# Patient Record
Sex: MALE | URBAN - METROPOLITAN AREA
[De-identification: names, ages, dates, MRNs, and addresses within clinical notes are randomized per-mention and may not be internally consistent; named-entity substitution may affect disease eponyms.]

---

## 2021-04-08 DIAGNOSIS — Z23 ENCOUNTER FOR IMMUNIZATION: ICD-10-CM

## 2022-12-07 ENCOUNTER — TELEPHONE (OUTPATIENT)
Dept: ENDOCRINOLOGY | Facility: CLINIC | Age: 57
End: 2022-12-07

## 2022-12-07 NOTE — TELEPHONE ENCOUNTER
Left message on pts vm to call our office tomm regarding Dr will need his records before he can be seen

## 2022-12-15 ENCOUNTER — OFFICE VISIT (OUTPATIENT)
Dept: ENDOCRINOLOGY | Facility: CLINIC | Age: 57
End: 2022-12-15

## 2022-12-15 VITALS
WEIGHT: 145 LBS | BODY MASS INDEX: 20.76 KG/M2 | SYSTOLIC BLOOD PRESSURE: 120 MMHG | HEART RATE: 99 BPM | HEIGHT: 70 IN | DIASTOLIC BLOOD PRESSURE: 82 MMHG

## 2022-12-15 DIAGNOSIS — E55.9 VITAMIN D DEFICIENCY: ICD-10-CM

## 2022-12-15 DIAGNOSIS — E11.65 TYPE 2 DIABETES MELLITUS WITH HYPERGLYCEMIA, WITHOUT LONG-TERM CURRENT USE OF INSULIN (HCC): Primary | ICD-10-CM

## 2022-12-15 DIAGNOSIS — I10 PRIMARY HYPERTENSION: ICD-10-CM

## 2022-12-15 DIAGNOSIS — E78.2 MIXED HYPERLIPIDEMIA: ICD-10-CM

## 2022-12-15 RX ORDER — INSULIN GLARGINE 100 [IU]/ML
INJECTION, SOLUTION SUBCUTANEOUS
Qty: 15 ML | Refills: 0 | Status: SHIPPED | OUTPATIENT
Start: 2022-12-15 | End: 2022-12-22 | Stop reason: SDUPTHER

## 2022-12-15 RX ORDER — SITAGLIPTIN AND METFORMIN HYDROCHLORIDE 100; 1000 MG/1; MG/1
1 TABLET, FILM COATED, EXTENDED RELEASE ORAL DAILY
COMMUNITY
Start: 2022-10-11 | End: 2022-12-15 | Stop reason: SDUPTHER

## 2022-12-15 RX ORDER — INSULIN GLARGINE 100 [IU]/ML
INJECTION, SOLUTION SUBCUTANEOUS
Qty: 15 ML | Refills: 0 | Status: SHIPPED | OUTPATIENT
Start: 2022-12-15 | End: 2022-12-15 | Stop reason: SDUPTHER

## 2022-12-15 RX ORDER — ATORVASTATIN CALCIUM 10 MG/1
TABLET, FILM COATED ORAL
Qty: 30 TABLET | Refills: 5 | Status: SHIPPED | OUTPATIENT
Start: 2022-12-15

## 2022-12-15 RX ORDER — BLOOD-GLUCOSE SENSOR
EACH MISCELLANEOUS
Qty: 2 EACH | Refills: 5 | Status: SHIPPED | OUTPATIENT
Start: 2022-12-15

## 2022-12-15 RX ORDER — FLASH GLUCOSE SENSOR
KIT MISCELLANEOUS
COMMUNITY
Start: 2022-10-15

## 2022-12-15 RX ORDER — SITAGLIPTIN AND METFORMIN HYDROCHLORIDE 100; 1000 MG/1; MG/1
TABLET, FILM COATED, EXTENDED RELEASE ORAL
Start: 2022-12-15

## 2022-12-15 RX ORDER — REPAGLINIDE 1 MG/1
TABLET ORAL
COMMUNITY
Start: 2022-10-11 | End: 2022-12-15 | Stop reason: ALTCHOICE

## 2022-12-15 RX ORDER — METFORMIN HYDROCHLORIDE 500 MG/1
500 TABLET, EXTENDED RELEASE ORAL
Qty: 30 TABLET | Refills: 5 | Status: SHIPPED | OUTPATIENT
Start: 2022-12-15

## 2022-12-15 RX ORDER — AMLODIPINE BESYLATE AND BENAZEPRIL HYDROCHLORIDE 5; 20 MG/1; MG/1
1 CAPSULE ORAL DAILY
COMMUNITY
Start: 2022-11-30

## 2022-12-15 RX ORDER — ROSUVASTATIN CALCIUM 10 MG/1
10 TABLET, COATED ORAL DAILY
COMMUNITY
Start: 2022-06-06 | End: 2022-12-15 | Stop reason: ALTCHOICE

## 2022-12-15 RX ORDER — PEN NEEDLE, DIABETIC 32GX 5/32"
NEEDLE, DISPOSABLE MISCELLANEOUS
Qty: 100 EACH | Refills: 1 | Status: SHIPPED | OUTPATIENT
Start: 2022-12-15

## 2022-12-15 RX ORDER — REPAGLINIDE 2 MG/1
TABLET ORAL
Qty: 90 TABLET | Refills: 6 | Status: SHIPPED | OUTPATIENT
Start: 2022-12-15

## 2022-12-15 NOTE — PATIENT INSTRUCTIONS
Take vitamin D ,2000 IU daily with dinner   Take metformin 500 mg with dinner   Take Janumet 100/1000, 1 tablet in AM with breakfast   Take Lantus 10 units in AM   Take Prandin 1 mg , 1 tablet with breakfast, 2 tablets with lunch and 2 tablets with dinner   Check blood sugars before breakfast, before dinner and at bedtime   Eat Carbs 45-60 grms per meals and 15 gram for snack     Basic Carbohydrate Counting   AMBULATORY CARE:   Carbohydrate counting  is a way to plan your meals by counting the amount of carbohydrate in foods  Carbohydrates are the sugars, starches, and fiber found in fruit, grains, vegetables, and milk products  Carbohydrates increase your blood sugar levels  Carbohydrate counting can help you eat the right amount of carbohydrate to keep your blood sugar levels under control  What you need to know about planning meals using carbohydrate counting:  A dietitian or healthcare provider will help you develop a healthy meal plan that works best for you  You will be taught how much carbohydrate to eat or drink for each meal and snack  Your meal plan will be based on your age, weight, usual food intake, and physical activity level  If you have diabetes, it will also include your blood sugar levels and diabetes medicine  Once you know how much carbohydrate you should eat, you can decide what type of food you want to eat  You will need to know what foods contain carbohydrate and how much they contain  Keep track of the amount of carbohydrate in meals and snacks in order to follow your meal plan  Do not avoid carbohydrates or skip meals  Your blood sugar may fall too low if you do not eat enough carbohydrate or you skip meals  Foods that contain carbohydrate:   Breads:  Each serving of food listed below contains about 15 g of carbohydrate       1 slice of bread (1 ounce) or 1 flour or corn tortilla (6 inch)    ½ of a hamburger bun or ¼ of a large bagel (about 1 ounce)    1 pancake (about 4 inches across and ¼ inch thick)    Cereals and grains:  Serving sizes of ready-to-eat cereals vary  Look at the serving size and the total carbohydrate amount listed on the food label  Each serving of food listed below contains about 15 g of carbohydrate   ¾ cup of dry, unsweetened, ready-to-eat cereal or ¼ cup of low-fat granola     ½ cup of oatmeal or other cooked cereal     ? cup of cooked rice or pasta    Starchy vegetables and beans:  Each serving of food listed below contains about 15 g of carbohydrate   ½ cup of corn, green peas, sweet potatoes, or mashed potatoes    ¼ of a large baked potato    ½ cup of beans, lentils, and peas (garbanzo, street, kidney, white, split, black-eyed)    Crackers and snacks:  Each serving of food listed below contains about 15 g of carbohydrate   3 jo-ann cracker squares or 8 animal crackers     6 saltine-type crackers    3 cups of popcorn or ¾ ounce of pretzels, potato chips, or tortilla chips    Fruit:  Each serving of food listed below contains about 15 g of carbohydrate   1 small (4 ounce) piece of fresh fruit or ¾ to 1 cup of fresh fruit    ½ cup of canned or frozen fruit, packed in natural juice    ½ cup (4 ounces) of unsweetened fruit juice    2 tablespoons of dried fruit    Desserts or sugary foods:  Each serving of food listed below contains about 15 g of carbohydrate   2-inch square unfrosted cake or brownie     2 small cookies    ½ cup of ice cream, frozen yogurt, or nondairy frozen yogurt    ¼ cup of sherbet or sorbet    1 tablespoon of regular syrup, jam, or jelly    2 tablespoons of light syrup    Milk and yogurt:  Foods from the milk group contain about 12 g of carbohydrate per serving  1 cup of fat-free or low-fat milk    1 cup of soy milk    ? cup of fat-free, yogurt sweetened with artificial sweetener    Non-starchy vegetables:  Each serving contains about 5 g of carbohydrate   Three servings of non-starch vegetables count as 1 carbohydrate serving       ½ cup of cooked vegetables or 1 cup of raw vegetables  This includes beets, broccoli, cabbage, cauliflower, cucumber, mushrooms, tomatoes, and zucchini    ½ cup of vegetable juice    How to use carbohydrate counting to plan meals:   Count carbohydrate amounts using serving sizes:      Pasta dinner example: You plan to have pasta, tossed salad, and an 8-ounce glass of milk  Your healthcare provider tells you that you may have 4 carbohydrate servings for dinner  One carbohydrate serving of pasta is ? cup  One cup of pasta will equal 3 carbohydrate servings  An 8-ounce glass of milk will count as 1 carbohydrate serving  These amounts of food would equal 4 carbohydrate servings  One cup of tossed salad does not count toward your carbohydrate servings  Count carbohydrate amounts using food labels:  Find the total amount of carbohydrate in a packaged food by reading the food label  Food labels tell you the serving size of the food and the total carbohydrate amount in each serving  Find the serving size on the food label and then decide how many servings you will eat  Multiply the number of servings you plan to eat by the carbohydrate amount per serving  Granola bar snack example: Your meal plan allows you to have 2 carbohydrate servings (30 grams) of carbohydrate for a snack  You plan to eat 1 package of granola bars, which contains 2 bars  According to the food label, the serving size of food in this package is 1 bar  Each serving (1 bar) contains 25 grams of carbohydrate  The total amount of carbohydrate in this package of granola bars would be 50 g  Based on your meal plan, you should eat only 1 bar  Follow up with your doctor as directed:  Write down your questions so you remember to ask them during your visits  © Copyright [employer] 2022 Information is for End User's use only and may not be sold, redistributed or otherwise used for commercial purposes   All illustrations and images included in CareNotes® are the copyrighted property of [employer]  or [facility]   The above information is an  only  It is not intended as medical advice for individual conditions or treatments  Talk to your doctor, nurse or pharmacist before following any medical regimen to see if it is safe and effective for you  Hypoglycemia instructions   Michelle Woowater  12/15/2022  [id number]    Low Blood Sugar    Steps to treat low blood sugar  1  Test blood sugar if you have symptoms of low blood sugar:   Low Blood Sugar Symptoms:  o Sweaty  o Dizzy  o Rapid heartbeat  o Shaky    o Bad mood  o Hungry      2  Treat blood sugar less than 70 with 15 grams of fast-acting carbohydrate:   Examples of 15 grams Fast-Acting Carbohydrate:  o 4 oz juice  o 4 oz regular soda  o 3-4 glucose tablets (chew)  o 3-4 hard candies (chew)              3    Wait 15 minutes and test your blood sugar again           4   If blood sugar is less than 100, repeat steps 2-3       5  When your blood sugar is 100 or more, eat a snack if it will be longer than one hour until your next meal  The snack should be 15 grams of carbohydrate and a protein:   Examples of snacks:  o ½ sandwich  o 6 crackers with cheese  o Piece of fruit with cheese or peanut butter  o 6 crackers with peanut butter

## 2022-12-15 NOTE — PROGRESS NOTES
Bryon Garcia 65 y o  male MRN: [id number]    Encounter: [id number]      Assessment/Plan     Assessment: This is a 65y o -year-old male with diabetes with hyperglycemia  Plan:    Diagnoses and all orders for this visit:    Type 2 diabetes mellitus with hyperglycemia, without long-term current use of insulin ([facility] [address] )  Lab Results   Component Value Date    HGBA1C 9 8 (H) 12/13/2022   Last A1c 9 8%, uncontrolled suggestive of hyperglycemia  Discussed to start using continuous glucose monitor sensor via [employer] for proper monitoring of blood sugars and prevention of hypoglycemia or hypoglycemia  We discussed pathophysiology of type 2 diabetes, mechanism of action of insulin as well as other medications, importance of glycemic control, to goal of 6 5-7% to prevent diabetes complications such as neuropathy, nephropathy, coronary artery disease, stroke and retinopathy  We also discussed considering C-peptide is on the low side, will rule out MARILEE ( Latent Autoimmune diabetes in Adults) by doing tiffany antibodies and islet cell antibodies  We will start Lantus 10 units in the morning because of poor pancreatic reserve,(which could be from gluco-toxicity)   We will start metformin 500 mg with dinner  Continue Janumet 1 tablet in the morning  Change Prandin to 2 mg, half tablet with breakfast and 1 tablet with lunch and dinner  Discussed resubmitting the log in 2 weeks to make further adjustment in regimen  We will repeat C-peptide in 3 months  Once blood sugars are improved we will consider starting SGLT2 inhibitor  Discussed importance of fingerstick monitoring, and sending log in 2 weeks    Discussed importance of follow-up appointment  Discussed hypoglycemia symptoms and treatment  Discussed importance of follow-up with Ophthalmology and podiatry  Educated about carbohydrate counting, dietary modifications, keeping carbohydrate consistent with meals 45 to 50 g with each meal and 15 g for snacks  Continue exercise routine of 30 minutes of walking daily     -     Insulin Pen Needle (BD Pen Needle Caroline U/F) 32G X 4 MM MISC; Use once daily in AM  -     metFORMIN (GLUCOPHAGE-XR) 500 mg 24 hr tablet; Take 1 tablet (500 mg total) by mouth daily with dinner  -     Hemoglobin A1C; Future  -     Basic metabolic panel; Future  -     Vitamin B12; Future  -     T4, free; Future  -     TSH, 3rd generation; Future  -     atorvastatin (LIPITOR) 10 mg tablet; Take one tablet at night  -     repaglinide (PRANDIN) 2 mg tablet; Take half tab with breakfast, 1 tablet with lunch and dinner , hold if blood sugars <100 mg dl  -     C-peptide; Future  -     Glutamic acid decarboxylase; Future  -     Anti-islet cell antibody; Future  -     Janumet -1000 MG TB24; Take one tablet daily with breakfast  -     Insulin Glargine Solostar (Lantus SoloStar) 100 UNIT/ML SOPN; Take 10 units in the morning, with breakfast  -     Continuous Blood Gluc Sensor (FreeStyle Verito 3 Sensor) MISC; Use 1 sensor every 2 weeks    Primary hypertension  BP Readings from Last 3 Encounters:   12/15/22 120/82   Blood pressure is well controlled  Continue management as per PCP  Educated about dietary modifications    Mixed hyperlipidemia  LDL is not at goal  Goal for LDL is less than 100  Start Lipitor 10 mg daily at bedtime  Discussed the side effects such as muscle aches and pains     -     Lipid panel; Future    Vitamin D deficiency/Osteopenia   Start vitamin D 3, 2000 IU daily   -     Vitamin D 25 hydroxy; Future    Other orders  -     amLODIPine-benazepril (LOTREL 5-20) 5-20 MG per capsule;  Take 1 capsule by mouth daily  -      CC: Diabetes    History of Present Illness     HPI:    Tiffany Fisher is 15-year-old gentleman with medical history of type 2 diabetes for 4 to 6 years, managed on oral medications, hypertension, hypercholesterolemia is here for establishment visit for type 2 diabetes management     Duration of type 2 diabetes - 5-6 yrs   History of COVID infection 2 years ago  Lab Results   Component Value Date    HGBA1C 9 8 (H) 12/13/2022     He checks blood sugars 1-2 times daily  He is planning to start using continuous glucose monitor sensor by [employer]  Mother- HTn, and diabetes , H/o Heart disease   Father - No h/o DM and HTN     Diabetes course has been unstable  Denies hospitalization for hyperglycemia or hypoglycemia previously  Current regimen, Prandin 1 mg with breakfast, 2 mg with lunch and 2 mg with dinner  Janumet 100/1000, 1 tablet daily with breakfast     For hyperlipidemia and was started on Crestor however currently not taking it  Last LDL is 110 mg per DL  For hypertension, he takes Lotrel 5/20 mg, 1 tablet daily  Blood pressure is well controlled  He admits no complications of diabetes, no complaints of tingling or numbness in extremities, no blurriness vision, no retinopathy, no chest pain, shortness of breath(exercise stress test was normal recently) no microalbuminuria    He walks most of the days during week , 30 minutes to 1 hour   Reviewed blood work results    Dexa scan showed Osteopenia   No h/o fracture bones   Wt Readings from Last 3 Encounters:   12/15/22 65 8 kg (145 lb)       Lab Results   Component Value Date    HGBA1C 9 8 (H) 12/13/2022     C-Peptide 0 8 - 4 2 ng/mL 1 1      C - PEPTIDE  Order: [id number]   Ref Range & Units 6/24/22  8:45 AM   C-Peptide 0 8 - 4 2 ng/mL 1 1    Specimen Collected: 06/24/22  8:45 AM Last Resulted: 06/27/22  5:49 PM   Received From: [address] [place] [address]  Result Received: 12/13/22 11:44 AM       LIPID PANEL  Order: [id number]   Ref Range & Units 12/13/22  7:38 AM   Cholesterol <200 mg/dL 187    Triglyceride <150 mg/dL 70    Cholesterol, HDL, Direct >40 mg/dL 65    Cholesterol, Non-HDL <160 mg/dL 122    Comment: Note: For NCEP interpretive guidelines please refer to the Laboratory Handbook     Cholesterol, LDL, Calculated <130 mg/dL 108    Comment: LDL Cholesterol was calculated using the Friedewald equation  Direct measurement of LDL is not indicated for this patient based on [facility]'s analytical algorithm for measurement of LDL Cholesterol  CHOL/HDL Ratio  2 88        Creatinine, Urine 50 0 - 200 0 mg/dL 45 8 Low     Albumin, Urine <2 0 mg/dL 1 2    Albumin/Creatinine Ratio, Urine <30 mg/gm CREA 26 2    Specimen Collected: 12/13/22  7:38 AM Last Resulted: 12/13/22 10:27 PM     Glucose 65 - 99 mg/dL 173 High     BUN 7 - 28 mg/dL 6 Low     Creatinine 0 53 - 1 30 mg/dL 0 90    Sodium 135 - 145 mmol/L 134 Low     Potassium 3 5 - 5 2 mmol/L 4 3    Chloride 100 - 109 mmol/L 100    Carbon Dioxide 23 - 31 mmol/L 25    Calcium 8 5 - 10 1 mg/dL 9 3    Alkaline Phosphatase 35 - 120 U/L 63    Albumin 3 5 - 4 8 g/dL 4 1    Bilirubin, Total 0 2 - 1 0 mg/dL 1 2 High     Comment: Use of this assay is not recommended for patients undergoing treatment with eltrombopag due to the potential for falsely elevated results  Protein, Total 6 3 - 8 3 g/dL 7 4    AST <41 U/L 23    ALT <56 U/L 40    Anion Gap 3 - 11 9    eGFRcr >59 100    eGFRcr Comment  Interpretive information: calculated GFR    Comment:                                            Units: mL/min per 1 73 square meters   Reported eGFR is based on the CKD-EPI 2021 creatinine equation that does not use a race coefficient and conforms to the NKF-ASN Task Force Recommendations     Note: Calculated GFR may not be an accurate indicator of renal function if the patient's renal function is not in a steady state                                               GFR Categories in Chronic Kidney Disease (CKD):   GFR        GFR (mL/min/1 73   Category:  square meters):  Interpretation:   G1         90 or greater    Normal or high*   G2         60 - 89          Mild decrease*   G3a        45 - 59          Mild to moderate decrease   G3b        30 - 44          Moderate to severe decrease   G4         15 - 29          Severe decrease   G5         14 or less       Kidney failure                                            *In the absence of evidence of kidney damage, neither GFR category G1 or G2 fulfill the criteria for CKD  Kidney Int Suppl 2013, 3: 1-1 50  Specimen Collected: 12/13/22  7:38 AM Last Resulted: 12/14/22 12:04 AM         Review of Systems   Constitutional: Negative for activity change, diaphoresis, fatigue, fever and unexpected weight change  HENT: Negative  Eyes: Negative for visual disturbance  Respiratory: Negative for cough, chest tightness and shortness of breath  Cardiovascular: Negative for chest pain, palpitations and leg swelling  Gastrointestinal: Negative for abdominal pain, blood in stool, constipation, diarrhea, nausea and vomiting  Endocrine: Negative for cold intolerance, heat intolerance, polydipsia, polyphagia and polyuria  Genitourinary: Negative for dysuria, enuresis, frequency and urgency  Musculoskeletal: Negative for arthralgias and myalgias  Skin: Negative for pallor, rash and wound  Allergic/Immunologic: Negative  Neurological: Negative for dizziness, tremors, weakness and numbness  Hematological: Negative  Psychiatric/Behavioral: Negative  Historical Information   History reviewed  No pertinent past medical history  History reviewed  No pertinent surgical history  Social History   Social History     Substance and Sexual Activity   Alcohol Use Yes    Comment: social     Social History     Substance and Sexual Activity   Drug Use Never     Social History     Tobacco Use   Smoking Status Never   Smokeless Tobacco Never     Family History: History reviewed  No pertinent family history      Meds/Allergies   Current Outpatient Medications   Medication Sig Dispense Refill   • amLODIPine-benazepril (LOTREL 5-20) 5-20 MG per capsule Take 1 capsule by mouth daily     • atorvastatin (LIPITOR) 10 mg tablet Take one tablet at night 30 tablet 5   • Continuous Blood Gluc Sensor (FreeStyle Verito 3 Sensor) MISC Use 1 sensor every 2 weeks 2 each 5   • Insulin Glargine Solostar (Lantus SoloStar) 100 UNIT/ML SOPN Take 10 units in the morning, with breakfast 15 mL 0   • Insulin Pen Needle (BD Pen Needle Caroline U/F) 32G X 4 MM MISC Use once daily in  each 1   • Janumet -1000 MG TB24 Take one tablet daily with breakfast     • metFORMIN (GLUCOPHAGE-XR) 500 mg 24 hr tablet Take 1 tablet (500 mg total) by mouth daily with dinner 30 tablet 5   • repaglinide (PRANDIN) 2 mg tablet Take half tab with breakfast, 1 tablet with lunch and dinner , hold if blood sugars <100 mg dl 90 tablet 6   • Continuous Blood Gluc Sensor (FreeStyle Verito 2 Sensor) MISC USE EVERY 14 DAYS (Patient not taking: Reported on 12/15/2022)       No current facility-administered medications for this visit  No Known Allergies    Objective   Vitals: Blood pressure 120/82, pulse 99, height 5' 10" (1 778 m), weight 65 8 kg (145 lb)  Physical Exam  Vitals reviewed  Constitutional:       General: He is not in acute distress  Appearance: He is well-developed  He is not diaphoretic  HENT:      Head: Normocephalic and atraumatic  Nose: Nose normal       Mouth/Throat:      Mouth: Mucous membranes are moist    Eyes:      General:         Right eye: No discharge  Left eye: No discharge  Conjunctiva/sclera: Conjunctivae normal       Pupils: Pupils are equal, round, and reactive to light  Neck:      Thyroid: No thyromegaly  Trachea: No tracheal deviation  Cardiovascular:      Rate and Rhythm: Normal rate and regular rhythm  Heart sounds: Normal heart sounds  No murmur heard  No friction rub  Pulmonary:      Effort: Pulmonary effort is normal  No respiratory distress  Breath sounds: Normal breath sounds  No wheezing or rales  Chest:      Chest wall: No tenderness  Abdominal:      General: Bowel sounds are normal  There is no distension  Palpations: Abdomen is soft  Tenderness: There is no abdominal tenderness  Musculoskeletal:         General: No tenderness or deformity  Normal range of motion  Cervical back: Normal range of motion and neck supple  Lymphadenopathy:      Cervical: No cervical adenopathy  Skin:     General: Skin is warm and dry  Coloration: Skin is not pale  Findings: No erythema or rash  Neurological:      General: No focal deficit present  Mental Status: He is alert and oriented to person, place, and time  Motor: No abnormal muscle tone  Coordination: Coordination normal       Deep Tendon Reflexes: Reflexes are normal and symmetric  Reflexes normal    Psychiatric:         Mood and Affect: Mood normal          Behavior: Behavior normal          The history was obtained from the review of the chart, patient  Lab Results:   Lab Results   Component Value Date/Time    Hemoglobin A1C 9 8 (H) 12/13/2022 07:38 AM    Hemoglobin A1C 10 9 (H) 04/26/2022 07:50 AM           Imaging Studies: I have personally reviewed pertinent reports  Portions of the record may have been created with voice recognition software  Occasional wrong word or "sound a like" substitutions may have occurred due to the inherent limitations of voice recognition software  Read the chart carefully and recognize, using context, where substitutions have occurred

## 2022-12-22 ENCOUNTER — TELEPHONE (OUTPATIENT)
Dept: ENDOCRINOLOGY | Facility: CLINIC | Age: 57
End: 2022-12-22

## 2022-12-22 DIAGNOSIS — E11.65 TYPE 2 DIABETES MELLITUS WITH HYPERGLYCEMIA, WITHOUT LONG-TERM CURRENT USE OF INSULIN (HCC): ICD-10-CM

## 2022-12-22 RX ORDER — INSULIN GLARGINE 100 [IU]/ML
INJECTION, SOLUTION SUBCUTANEOUS
Qty: 15 ML | Refills: 1 | Status: SHIPPED | OUTPATIENT
Start: 2022-12-22

## 2023-01-04 ENCOUNTER — PATIENT MESSAGE (OUTPATIENT)
Dept: ENDOCRINOLOGY | Facility: CLINIC | Age: 58
End: 2023-01-04

## 2023-01-04 DIAGNOSIS — E11.65 TYPE 2 DIABETES MELLITUS WITH HYPERGLYCEMIA, WITHOUT LONG-TERM CURRENT USE OF INSULIN (HCC): ICD-10-CM

## 2023-01-04 RX ORDER — INSULIN GLARGINE 100 [IU]/ML
INJECTION, SOLUTION SUBCUTANEOUS
Qty: 15 ML | Refills: 1 | Status: SHIPPED | OUTPATIENT
Start: 2023-01-04 | End: 2023-01-10 | Stop reason: SDUPTHER

## 2023-01-10 DIAGNOSIS — E11.65 TYPE 2 DIABETES MELLITUS WITH HYPERGLYCEMIA, WITHOUT LONG-TERM CURRENT USE OF INSULIN (HCC): ICD-10-CM

## 2023-01-10 RX ORDER — INSULIN GLARGINE 100 [IU]/ML
INJECTION, SOLUTION SUBCUTANEOUS
Qty: 15 ML | Refills: 1 | Status: SHIPPED | OUTPATIENT
Start: 2023-01-10 | End: 2023-01-13 | Stop reason: SDUPTHER

## 2023-01-13 ENCOUNTER — PATIENT MESSAGE (OUTPATIENT)
Dept: ENDOCRINOLOGY | Facility: CLINIC | Age: 58
End: 2023-01-13

## 2023-01-13 DIAGNOSIS — E11.65 TYPE 2 DIABETES MELLITUS WITH HYPERGLYCEMIA, WITHOUT LONG-TERM CURRENT USE OF INSULIN (HCC): ICD-10-CM

## 2023-01-13 RX ORDER — INSULIN GLARGINE 100 [IU]/ML
INJECTION, SOLUTION SUBCUTANEOUS
Qty: 15 ML | Refills: 1 | Status: SHIPPED | OUTPATIENT
Start: 2023-01-13 | End: 2023-01-17 | Stop reason: ALTCHOICE

## 2023-01-17 DIAGNOSIS — E11.65 TYPE 2 DIABETES MELLITUS WITH HYPERGLYCEMIA, WITHOUT LONG-TERM CURRENT USE OF INSULIN (HCC): Primary | ICD-10-CM

## 2023-01-17 RX ORDER — INSULIN GLARGINE 300 U/ML
10 INJECTION, SOLUTION SUBCUTANEOUS
Qty: 3 ML | Refills: 1 | Status: SHIPPED | OUTPATIENT
Start: 2023-01-17

## 2023-06-01 DIAGNOSIS — E11.65 TYPE 2 DIABETES MELLITUS WITH HYPERGLYCEMIA, WITHOUT LONG-TERM CURRENT USE OF INSULIN (HCC): ICD-10-CM

## 2023-06-01 RX ORDER — METFORMIN HYDROCHLORIDE 500 MG/1
TABLET, EXTENDED RELEASE ORAL
Qty: 30 TABLET | Refills: 5 | OUTPATIENT
Start: 2023-06-01

## 2023-06-27 DIAGNOSIS — E11.65 TYPE 2 DIABETES MELLITUS WITH HYPERGLYCEMIA, WITHOUT LONG-TERM CURRENT USE OF INSULIN (HCC): ICD-10-CM

## 2023-06-27 RX ORDER — PEN NEEDLE, DIABETIC 32GX 5/32"
NEEDLE, DISPOSABLE MISCELLANEOUS
Qty: 100 EACH | Refills: 1 | Status: SHIPPED | OUTPATIENT
Start: 2023-06-27

## 2023-07-08 DIAGNOSIS — E11.65 TYPE 2 DIABETES MELLITUS WITH HYPERGLYCEMIA, WITHOUT LONG-TERM CURRENT USE OF INSULIN (HCC): ICD-10-CM

## 2023-07-10 RX ORDER — REPAGLINIDE 2 MG/1
TABLET ORAL
Qty: 90 TABLET | Refills: 1 | Status: SHIPPED | OUTPATIENT
Start: 2023-07-10

## 2023-07-10 RX ORDER — REPAGLINIDE 2 MG/1
TABLET ORAL
Qty: 90 TABLET | Refills: 6 | OUTPATIENT
Start: 2023-07-10

## 2023-07-10 NOTE — TELEPHONE ENCOUNTER
Called pt to schedule pt for an appt and I called pt and left message on pts voicemail to call us to schedule an appt

## 2023-07-10 NOTE — TELEPHONE ENCOUNTER
I have refilled it for now , pt was last seen in Dec 2022       Please call pt to notify, he needs to follow up in order to get future refills     Esther Sherwood

## 2023-07-26 DIAGNOSIS — E11.65 TYPE 2 DIABETES MELLITUS WITH HYPERGLYCEMIA, WITHOUT LONG-TERM CURRENT USE OF INSULIN (HCC): ICD-10-CM

## 2023-07-26 RX ORDER — METFORMIN HYDROCHLORIDE 500 MG/1
500 TABLET, EXTENDED RELEASE ORAL
Qty: 30 TABLET | Refills: 0 | OUTPATIENT
Start: 2023-07-26

## 2023-07-26 RX ORDER — METFORMIN HYDROCHLORIDE 500 MG/1
500 TABLET, EXTENDED RELEASE ORAL
Qty: 90 TABLET | Refills: 0 | Status: SHIPPED | OUTPATIENT
Start: 2023-07-26

## 2023-07-26 RX ORDER — SITAGLIPTIN AND METFORMIN HYDROCHLORIDE 1000; 100 MG/1; MG/1
TABLET, FILM COATED, EXTENDED RELEASE ORAL
Qty: 90 TABLET | Refills: 0 | Status: SHIPPED | OUTPATIENT
Start: 2023-07-26

## 2023-07-26 RX ORDER — REPAGLINIDE 2 MG/1
TABLET ORAL
Qty: 90 TABLET | Refills: 0 | Status: SHIPPED | OUTPATIENT
Start: 2023-07-26 | End: 2023-08-24

## 2023-07-26 RX ORDER — REPAGLINIDE 2 MG/1
TABLET ORAL
Qty: 90 TABLET | Refills: 0 | OUTPATIENT
Start: 2023-07-26

## 2023-07-26 NOTE — TELEPHONE ENCOUNTER
I have given 3 months supply, if pt does not make appt or follow up , will not refill the meds     Please call pt and make appt for follow up, 12.40 appt is [facility] , also explain pt it is important to follow up     [facility]

## 2023-08-24 DIAGNOSIS — E11.65 TYPE 2 DIABETES MELLITUS WITH HYPERGLYCEMIA, WITHOUT LONG-TERM CURRENT USE OF INSULIN (HCC): ICD-10-CM

## 2023-08-24 RX ORDER — REPAGLINIDE 2 MG/1
TABLET ORAL
Qty: 90 TABLET | Refills: 0 | Status: SHIPPED | OUTPATIENT
Start: 2023-08-24

## 2023-08-24 NOTE — TELEPHONE ENCOUNTER
Please call patient to schedule follow up. A 30 day supply refill has been sent to the pharmacy but we will not be able to proscribe further medications until patient follows up.

## 2023-08-24 NOTE — TELEPHONE ENCOUNTER
Please inform pt that he must make appt , as per protocol we will not be able to renew medications, if appt is not made ( I have sent rx for 30 days )     Ezra Persaud

## 2023-09-21 DIAGNOSIS — E11.65 TYPE 2 DIABETES MELLITUS WITH HYPERGLYCEMIA, WITHOUT LONG-TERM CURRENT USE OF INSULIN (HCC): ICD-10-CM

## 2023-09-21 RX ORDER — REPAGLINIDE 2 MG/1
TABLET ORAL
Qty: 90 TABLET | Refills: 1 | Status: SHIPPED | OUTPATIENT
Start: 2023-09-21

## 2023-10-08 DIAGNOSIS — E11.65 TYPE 2 DIABETES MELLITUS WITH HYPERGLYCEMIA, WITHOUT LONG-TERM CURRENT USE OF INSULIN (HCC): ICD-10-CM

## 2023-10-09 ENCOUNTER — TELEPHONE (OUTPATIENT)
Dept: ENDOCRINOLOGY | Facility: CLINIC | Age: 58
End: 2023-10-09

## 2023-10-09 RX ORDER — SITAGLIPTIN AND METFORMIN HYDROCHLORIDE 1000; 100 MG/1; MG/1
TABLET, FILM COATED, EXTENDED RELEASE ORAL
Qty: 90 TABLET | Refills: 0 | Status: SHIPPED | OUTPATIENT
Start: 2023-10-09

## 2023-10-09 NOTE — TELEPHONE ENCOUNTER
Pt failed the protocol   Please call pt to make appt this week , Friday at the end of the day, he is overdue for blood work, we will not be able to refill medications with out blood work ,   Last appt was in Dec, appt is overdue     [facility] Surgeon: Tu Hartman Jr., M.D.    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  Patient does take PPI on regular basis    Procedure:     The procedure, indications, preparation and potential complications were explained to the patient, who indicated understanding and signed the corresponding consent forms.  The patient was identified, taken to the endoscopy suite, and placed on the left side down decubitus position.  The patient underwent a MAC anesthesia and was appropriately monitored through the case by the anesthesia personnel with continuous pulse oximetry, blood pressure, and cardiac monitoring.  A bite block was placed.  After adequate IV sedation and using a transoral technique a lubed flexible endoscope was placed in the hypopharynx and advanced to the second portion of the duodenum without difficulty. The scope was then withdrawn back into the antrum of the stomach.  Cold forcep biopsies of the antrum were taken to rule out Helicobacter pylori.  The scope was retroflexed noting the body, fundus and cardia.  The scope was then withdrawn back into the esophagus after decompressing the stomach.  The Z line was noted and GE junction measured from the incisors.  The scope was then completely withdrawn.  The patient tolerated the procedure well and left the endoscopy suite in stable condition.  The findings are listed below.    Duodenum: Unremarkable  Antrum: Patchy erythema  Body/Fundus: Minimal scarring from previous lap band removal  Cardia: Unremarkable  Esophagus: Z-line regular, no erosive esophagitis or Medrano's noted    Recommendations:     Await biopsy results and follow-up in the office

## 2023-10-16 ENCOUNTER — TELEPHONE (OUTPATIENT)
Dept: ENDOCRINOLOGY | Facility: CLINIC | Age: 58
End: 2023-10-16

## 2023-10-16 DIAGNOSIS — E55.9 VITAMIN D DEFICIENCY: ICD-10-CM

## 2023-10-16 DIAGNOSIS — Z79.4 TYPE 2 DIABETES MELLITUS WITH HYPERGLYCEMIA, WITH LONG-TERM CURRENT USE OF INSULIN (HCC): Primary | ICD-10-CM

## 2023-10-16 DIAGNOSIS — E78.2 MIXED HYPERLIPIDEMIA: ICD-10-CM

## 2023-10-16 DIAGNOSIS — E11.65 TYPE 2 DIABETES MELLITUS WITH HYPERGLYCEMIA, WITH LONG-TERM CURRENT USE OF INSULIN (HCC): Primary | ICD-10-CM

## 2023-10-16 LAB
EXTERNAL EGFR: 92
HBA1C MFR BLD HPLC: 8.5 %

## 2023-10-16 NOTE — TELEPHONE ENCOUNTER
Spoke with pt couple of days ago, about making appt for follow up, my chart message was also sent, appt has not been made [facility] [place] [facility] [place] and spoke with wife, Regarding appt is needed for follow up,   Also discussed we will not be able to refill the meds, until blood work is complete and appt is made   Blood work is ordered in [employer]     Please call pt and make appt this week at the end of the day     Thanks     Red Seraphim

## 2023-10-18 ENCOUNTER — TELEPHONE (OUTPATIENT)
Dept: ENDOCRINOLOGY | Facility: CLINIC | Age: 58
End: 2023-10-18

## 2023-10-20 ENCOUNTER — TELEPHONE (OUTPATIENT)
Dept: ENDOCRINOLOGY | Facility: CLINIC | Age: 58
End: 2023-10-20

## 2023-10-25 ENCOUNTER — TELEPHONE (OUTPATIENT)
Dept: ENDOCRINOLOGY | Facility: CLINIC | Age: 58
End: 2023-10-25

## 2023-10-25 NOTE — TELEPHONE ENCOUNTER
Called and spoke with pt , discussed the results, A1c is 8.5%. Also discussed that her vitamin B12 is low, he should start taking vitamin B12 supplementation, 100 mcg daily and vitamin D3 supplementation 2000 IU daily.   Discussed to make follow-up appointment, patient is able to come on November 1 at 3 PM.  He had made appointment at 3 PM on November 1    Jun Beckwith

## 2023-11-01 ENCOUNTER — OFFICE VISIT (OUTPATIENT)
Dept: ENDOCRINOLOGY | Facility: CLINIC | Age: 58
End: 2023-11-01
Payer: COMMERCIAL

## 2023-11-01 VITALS
BODY MASS INDEX: 21.38 KG/M2 | SYSTOLIC BLOOD PRESSURE: 144 MMHG | HEART RATE: 84 BPM | WEIGHT: 149 LBS | DIASTOLIC BLOOD PRESSURE: 90 MMHG | OXYGEN SATURATION: 99 %

## 2023-11-01 DIAGNOSIS — E11.65 TYPE 2 DIABETES MELLITUS WITH HYPERGLYCEMIA, WITHOUT LONG-TERM CURRENT USE OF INSULIN (HCC): ICD-10-CM

## 2023-11-01 DIAGNOSIS — E55.9 VITAMIN D DEFICIENCY: ICD-10-CM

## 2023-11-01 DIAGNOSIS — E53.8 VITAMIN B12 DEFICIENCY: ICD-10-CM

## 2023-11-01 DIAGNOSIS — I10 PRIMARY HYPERTENSION: ICD-10-CM

## 2023-11-01 DIAGNOSIS — Z79.4 TYPE 2 DIABETES MELLITUS WITH HYPERGLYCEMIA, WITH LONG-TERM CURRENT USE OF INSULIN (HCC): Primary | ICD-10-CM

## 2023-11-01 DIAGNOSIS — E78.2 MIXED HYPERLIPIDEMIA: ICD-10-CM

## 2023-11-01 DIAGNOSIS — E11.65 TYPE 2 DIABETES MELLITUS WITH HYPERGLYCEMIA, WITH LONG-TERM CURRENT USE OF INSULIN (HCC): Primary | ICD-10-CM

## 2023-11-01 DIAGNOSIS — E53.8 VITAMIN B 12 DEFICIENCY: ICD-10-CM

## 2023-11-01 PROCEDURE — 99215 OFFICE O/P EST HI 40 MIN: CPT | Performed by: INTERNAL MEDICINE

## 2023-11-01 RX ORDER — SITAGLIPTIN AND METFORMIN HYDROCHLORIDE 1000; 100 MG/1; MG/1
TABLET, FILM COATED, EXTENDED RELEASE ORAL
Qty: 90 TABLET | Refills: 1 | Status: SHIPPED | OUTPATIENT
Start: 2023-11-01

## 2023-11-01 RX ORDER — INSULIN GLARGINE 300 U/ML
INJECTION, SOLUTION SUBCUTANEOUS
Qty: 9 ML | Refills: 2 | Status: SHIPPED | OUTPATIENT
Start: 2023-11-01

## 2023-11-01 RX ORDER — ATORVASTATIN CALCIUM 10 MG/1
TABLET, FILM COATED ORAL
Qty: 90 TABLET | Refills: 1 | Status: SHIPPED | OUTPATIENT
Start: 2023-11-01

## 2023-11-01 RX ORDER — BLOOD-GLUCOSE SENSOR
EACH MISCELLANEOUS
Qty: 2 EACH | Refills: 5 | Status: SHIPPED | OUTPATIENT
Start: 2023-11-01

## 2023-11-01 RX ORDER — REPAGLINIDE 2 MG/1
TABLET ORAL
Qty: 90 TABLET | Refills: 1 | Status: SHIPPED | OUTPATIENT
Start: 2023-11-01

## 2023-11-01 NOTE — PROGRESS NOTES
Ester Weiner 65 y.o. male MRN: [id number]    Encounter: [id number]      Assessment/Plan     Assessment: This is a 65y.o.-year-old male with diabetes with hyperglycemia. Plan:  Diagnoses and all orders for this visit:    Type 2 diabetes mellitus with hyperglycemia, with long-term current use of insulin ([address])  Lab Results   Component Value Date    HGBA1C 8.5 10/16/2023   Last A1c is 8.5%, uncontrolled. Fasting blood sugars are usually elevated 150 to 180 mg per DL range, will increase Lantus to 14 units, discussed to take at dinnertime so less chances for missing doses. Continue Prandin 2 mg with breakfast, lunch and dinner, continue Janumet 100/1000, 1 tablet daily in the morning and take 500 mg of metformin at dinnertime  Discussed the importance of checking blood sugars 3-4 times daily, send a prescription for continuous glucose monitor sensor for monitoring of blood sugars regularly. Discussed the goal for blood sugar is 80 to 160 mg per DL. Educated about hypoglycemia symptoms and treatment  Discussed long-term complications of uncontrolled diabetes including risk of stroke, heart attack, nephropathy, neuropathy and retinopathy. Discussed the importance of follow-up appointment with ophthalmology. Mixed hyperlipidemia  LDL is slightly elevated, continue statins. Patient stated that he has been missing doses. Educated about taking medications regularly, goal for LDL is less than 70 mg per DL  Primary hypertension  Pressure is slightly elevated, continue current management as per PCP  Vitamin D deficiency  Take vitamin D3 supplementation 2000 IU daily regularly  Type 2 diabetes mellitus with hyperglycemia, without long-term current use of insulin (HCC)  -     insulin glargine (Toujeo SoloStar) 300 units/mL CONCENTRATED U-300 injection pen (1-unit dial);  Inject 14 units at 6 PM  -     repaglinide (PRANDIN) 2 mg tablet; TAKE 1 TABLET BY MOUTH WITH BREAKFAST THEN TAKE 1 TABLET BY MOUTH WITH LUNCH AND DINNER. HOLD IF BLOOD SUGARS< 100MG / DL  -     atorvastatin (LIPITOR) 10 mg tablet; Take one tablet at night  -     Janumet -1000 MG TB24; TAKE 1 TABLET BY MOUTH EVERY DAY WITH BREAKFAST  -     Continuous Blood Gluc Sensor (FreeStyle Verito 3 Sensor) MISC; Use 1 sensor every 2 weeks   Vitamin B12 deficiency  Vitamin B12 is low 163 PG per mL  Discussed to start taking vitamin B-12, 500 mcg daily      I have spent a total time of 40 minutes on 11/03/23 in caring for this patient including Diagnostic results, Prognosis, Risks and benefits of tx options, Instructions for management, Patient and family education, Importance of tx compliance, Risk factor reductions, Impressions, Counseling / Coordination of care, Documenting in the medical record, Reviewing / ordering tests, medicine, procedures  , and Obtaining or reviewing history  . CC: Diabetes    History of Present Illness     HPI:    Ester Weiner 52-year-old gentleman with medical history of type 2 diabetes for 7 years, managed on basal insulin and oral medications, hypertension and hypercholesterolemia is here for type 2 diabetes follow-up. His last A1c was 9.8% in December 2022  Most recent A1c is 8.5%, improved. Diabetes course has been stable    Current regimen includes Lantus 10 units at bedtime, Prandin 2 mg with breakfast, lunch and dinner, Janumet 100/1000, 1 tablet in the morning, 500 mg at dinnertime. He admits compliance with medications, follows low carbohydrate diet most of the time  He exercises regularly, walks on daily basis 4 to 5 miles.     DEXA scan previously showed osteopenia, no history of fractures  Currently not taking vitamin D or B12 supplementation  Lab Results   Component Value Date    HGBA1C 8.5 10/16/2023     Blood work reviewed from October 16, 2023  Free T4 is 0.76, TSH is 1.48, cholesterol 186, triglycerides 69, HDL 55, , C-peptide is 1.1, hemoglobin A1c is 8.5, glucose is 176, BUN is 9 creatinine 0.9, sodium 131, potassium 4.5, calcium 9.3 anion gap 9, GFR is 9212 is 163 low, vitamin D is 18, tiffany antibodies negative, islet cell antibodies negative     Review of Systems   Constitutional:  Negative for activity change, diaphoresis, fatigue, fever and unexpected weight change. HENT: Negative. Eyes:  Negative for visual disturbance. Respiratory:  Negative for cough, chest tightness and shortness of breath. Cardiovascular:  Negative for chest pain, palpitations and leg swelling. Gastrointestinal:  Negative for abdominal pain, blood in stool, constipation, diarrhea, nausea and vomiting. Endocrine: Negative for cold intolerance, heat intolerance, polydipsia, polyphagia and polyuria. Genitourinary:  Negative for dysuria, enuresis, frequency and urgency. Musculoskeletal:  Negative for arthralgias and myalgias. Skin:  Negative for pallor, rash and wound. Allergic/Immunologic: Negative. Neurological:  Negative for dizziness, tremors, weakness and numbness. Hematological: Negative. Psychiatric/Behavioral: Negative. Historical Information   History reviewed. No pertinent past medical history. History reviewed. No pertinent surgical history.   Social History   Social History     Substance and Sexual Activity   Alcohol Use Yes    Alcohol/week: 2.0 standard drinks of alcohol    Types: 2 Glasses of wine per week    Comment: social     Social History     Substance and Sexual Activity   Drug Use Never     Social History     Tobacco Use   Smoking Status Never   Smokeless Tobacco Never     Family History:   Family History   Problem Relation Age of Onset    Diabetes type II Mother     Hypertension Mother        Meds/Allergies   Current Outpatient Medications   Medication Sig Dispense Refill    amLODIPine-benazepril (LOTREL 5-20) 5-20 MG per capsule Take 1 capsule by mouth daily      atorvastatin (LIPITOR) 10 mg tablet Take one tablet at night 90 tablet 1    BD Pen Needle Caroline 2nd Gen 32G X 4 MM MISC USE ONCE DAILY IN THE MORNING 100 each 1    Continuous Blood Gluc Sensor (FreeStyle Verito 3 Sensor) MISC Use 1 sensor every 2 weeks 2 each 5    insulin glargine (Toujeo SoloStar) 300 units/mL CONCENTRATED U-300 injection pen (1-unit dial) Inject 14 units at 6 PM 9 mL 2    Janumet -1000 MG TB24 TAKE 1 TABLET BY MOUTH EVERY DAY WITH BREAKFAST 90 tablet 1    metFORMIN (GLUCOPHAGE-XR) 500 mg 24 hr tablet Take 1 tablet (500 mg total) by mouth daily with dinner 90 tablet 0    repaglinide (PRANDIN) 2 mg tablet TAKE 1 TABLET BY MOUTH WITH BREAKFAST THEN TAKE 1 TABLET BY MOUTH WITH LUNCH AND DINNER. HOLD IF BLOOD SUGARS< 100MG / DL 90 tablet 1    Continuous Blood Gluc Sensor (FreeStyle Verito 2 Sensor) MISC USE EVERY 14 DAYS (Patient not taking: Reported on 12/15/2022)       No current facility-administered medications for this visit. No Known Allergies    Objective   Vitals: Blood pressure 144/90, pulse 84, weight 67.6 kg (149 lb), SpO2 99 %. Physical Exam  Vitals reviewed. Constitutional:       General: He is not in acute distress. Appearance: Normal appearance. He is well-developed and normal weight. HENT:      Head: Normocephalic and atraumatic. Eyes:      Pupils: Pupils are equal, round, and reactive to light. Neck:      Thyroid: No thyromegaly. Cardiovascular:      Rate and Rhythm: Normal rate and regular rhythm. Pulses: no weak pulses          Dorsalis pedis pulses are 2+ on the right side and 2+ on the left side. Posterior tibial pulses are 2+ on the right side and 2+ on the left side. Heart sounds: Normal heart sounds. Pulmonary:      Effort: Pulmonary effort is normal. No respiratory distress. Breath sounds: Normal breath sounds. Musculoskeletal:         General: No deformity. Normal range of motion. Cervical back: Normal range of motion and neck supple.    Feet:      Right foot:      Skin integrity: No ulcer, skin breakdown, erythema, warmth, callus or dry skin. Left foot:      Skin integrity: No ulcer, skin breakdown, erythema, warmth, callus or dry skin. Skin:     General: Skin is warm and dry. Findings: No erythema. Neurological:      General: No focal deficit present. Mental Status: He is alert and oriented to person, place, and time. Psychiatric:         Mood and Affect: Mood normal.         Behavior: Behavior normal.     Diabetic Foot Exam    Patient's shoes and socks removed. Right Foot/Ankle   Right Foot Inspection  Skin Exam: skin normal and skin intact. No dry skin, no warmth, no callus, no erythema, no maceration, no abnormal color, no pre-ulcer, no ulcer and no callus. Toe Exam: ROM and strength within normal limits. Sensory   Vibration: intact  Proprioception: intact  Monofilament testing: intact    Vascular  The right DP pulse is 2+. The right PT pulse is 2+. Left Foot/Ankle  Left Foot Inspection  Skin Exam: skin normal and skin intact. No dry skin, no warmth, no erythema, no maceration, normal color, no pre-ulcer, no ulcer and no callus. Toe Exam: ROM and strength within normal limits. Sensory   Vibration: intact  Proprioception: intact  Monofilament testing: intact    Vascular  The left DP pulse is 2+. The left PT pulse is 2+. Assign Risk Category  No deformity present  No loss of protective sensation  No weak pulses  Risk: 0      The history was obtained from the review of the chart, patient. Lab Results:   Lab Results   Component Value Date/Time    Hemoglobin A1C 8.5 10/16/2023 12:00 AM    Hemoglobin A1C 9.8 (H) 12/13/2022 07:38 AM           Imaging Studies: I have personally reviewed pertinent reports. Portions of the record may have been created with voice recognition software. Occasional wrong word or "sound a like" substitutions may have occurred due to the inherent limitations of voice recognition software.  Read the chart carefully and recognize, using context, where substitutions have occurred. Azithromycin Counseling:  I discussed with the patient the risks of azithromycin including but not limited to GI upset, allergic reaction, drug rash, diarrhea, and yeast infections.

## 2023-11-02 DIAGNOSIS — E11.65 TYPE 2 DIABETES MELLITUS WITH HYPERGLYCEMIA, WITHOUT LONG-TERM CURRENT USE OF INSULIN (HCC): ICD-10-CM

## 2023-11-03 DIAGNOSIS — E11.65 TYPE 2 DIABETES MELLITUS WITH HYPERGLYCEMIA, WITHOUT LONG-TERM CURRENT USE OF INSULIN (HCC): ICD-10-CM

## 2023-11-03 RX ORDER — PEN NEEDLE, DIABETIC 32GX 5/32"
NEEDLE, DISPOSABLE MISCELLANEOUS
Qty: 100 EACH | Refills: 0 | Status: SHIPPED | OUTPATIENT
Start: 2023-11-03 | End: 2023-11-03 | Stop reason: SDUPTHER

## 2023-11-03 RX ORDER — PEN NEEDLE, DIABETIC 32GX 5/32"
NEEDLE, DISPOSABLE MISCELLANEOUS
Qty: 100 EACH | Refills: 0 | Status: SHIPPED | OUTPATIENT
Start: 2023-11-03

## 2023-12-20 DIAGNOSIS — E11.65 TYPE 2 DIABETES MELLITUS WITH HYPERGLYCEMIA, WITHOUT LONG-TERM CURRENT USE OF INSULIN (HCC): ICD-10-CM

## 2023-12-21 RX ORDER — REPAGLINIDE 2 MG/1
TABLET ORAL
Qty: 90 TABLET | Refills: 0 | Status: SHIPPED | OUTPATIENT
Start: 2023-12-21

## 2023-12-23 ENCOUNTER — VBI (OUTPATIENT)
Dept: ADMINISTRATIVE | Facility: OTHER | Age: 58
End: 2023-12-23

## 2024-01-19 DIAGNOSIS — E11.65 TYPE 2 DIABETES MELLITUS WITH HYPERGLYCEMIA, WITHOUT LONG-TERM CURRENT USE OF INSULIN (HCC): ICD-10-CM

## 2024-01-19 RX ORDER — REPAGLINIDE 2 MG/1
TABLET ORAL
Qty: 90 TABLET | Refills: 0 | Status: SHIPPED | OUTPATIENT
Start: 2024-01-19

## 2024-01-27 DIAGNOSIS — E11.65 TYPE 2 DIABETES MELLITUS WITH HYPERGLYCEMIA, WITHOUT LONG-TERM CURRENT USE OF INSULIN (HCC): ICD-10-CM

## 2024-01-29 RX ORDER — PEN NEEDLE, DIABETIC 32GX 5/32"
NEEDLE, DISPOSABLE MISCELLANEOUS
Qty: 100 EACH | Refills: 0 | Status: SHIPPED | OUTPATIENT
Start: 2024-01-29

## 2024-02-05 DIAGNOSIS — E11.65 TYPE 2 DIABETES MELLITUS WITH HYPERGLYCEMIA, WITHOUT LONG-TERM CURRENT USE OF INSULIN (HCC): ICD-10-CM

## 2024-02-05 RX ORDER — METFORMIN HYDROCHLORIDE 500 MG/1
500 TABLET, EXTENDED RELEASE ORAL
Qty: 90 TABLET | Refills: 0 | Status: SHIPPED | OUTPATIENT
Start: 2024-02-05

## 2024-02-16 DIAGNOSIS — E11.65 TYPE 2 DIABETES MELLITUS WITH HYPERGLYCEMIA, WITHOUT LONG-TERM CURRENT USE OF INSULIN (HCC): ICD-10-CM

## 2024-02-16 RX ORDER — REPAGLINIDE 2 MG/1
TABLET ORAL
Qty: 90 TABLET | Refills: 0 | Status: SHIPPED | OUTPATIENT
Start: 2024-02-16

## 2024-03-04 LAB
25(OH)D3+25(OH)D2 SERPL-MCNC: 15 NG/ML (ref 30–100)
ALBUMIN SERPL-MCNC: 4.4 G/DL (ref 3.5–5.7)
ALP SERPL-CCNC: 54 U/L (ref 35–120)
ALT SERPL-CCNC: 24 U/L
ANION GAP SERPL CALCULATED.3IONS-SCNC: 15 MMOL/L (ref 3–11)
AST SERPL-CCNC: 19 U/L
BILIRUB SERPL-MCNC: 1.1 MG/DL (ref 0.2–1)
BUN SERPL-MCNC: 8 MG/DL (ref 7–28)
CALCIUM SERPL-MCNC: 9.1 MG/DL (ref 8.5–10.1)
CHLORIDE SERPL-SCNC: 97 MMOL/L (ref 100–109)
CO2 SERPL-SCNC: 22 MMOL/L (ref 21–31)
CREAT SERPL-MCNC: 0.83 MG/DL (ref 0.53–1.3)
CYTOLOGY CMNT CVX/VAG CYTO-IMP: ABNORMAL
EST. AVERAGE GLUCOSE BLD GHB EST-MCNC: 186 MG/DL
GFR/BSA.PRED SERPLBLD CYS-BASED-ARV: 102 ML/MIN/{1.73_M2}
GLUCOSE SERPL-MCNC: 128 MG/DL (ref 65–99)
HBA1C MFR BLD: 8.1 %
POTASSIUM SERPL-SCNC: 4.2 MMOL/L (ref 3.5–5.2)
PROT SERPL-MCNC: 7 G/DL (ref 6.3–8.3)
SODIUM SERPL-SCNC: 134 MMOL/L (ref 135–145)
VIT B12 SERPL-MCNC: 183 PG/ML (ref 180–914)

## 2024-03-05 NOTE — RESULT ENCOUNTER NOTE
Abdi Mondragon   Your Hba1c improved, vitamin D is very low and vitamin B12 is low   Please follow up for your appt, as scheduled     Apryl Ortiz

## 2024-03-06 ENCOUNTER — OFFICE VISIT (OUTPATIENT)
Dept: ENDOCRINOLOGY | Facility: CLINIC | Age: 59
End: 2024-03-06
Payer: COMMERCIAL

## 2024-03-06 VITALS
BODY MASS INDEX: 21.52 KG/M2 | OXYGEN SATURATION: 99 % | WEIGHT: 150 LBS | DIASTOLIC BLOOD PRESSURE: 88 MMHG | SYSTOLIC BLOOD PRESSURE: 138 MMHG | HEART RATE: 99 BPM

## 2024-03-06 DIAGNOSIS — E11.65 TYPE 2 DIABETES MELLITUS WITH HYPERGLYCEMIA, WITHOUT LONG-TERM CURRENT USE OF INSULIN (HCC): Primary | ICD-10-CM

## 2024-03-06 DIAGNOSIS — E11.65 TYPE 2 DIABETES MELLITUS WITH HYPERGLYCEMIA, WITHOUT LONG-TERM CURRENT USE OF INSULIN (HCC): ICD-10-CM

## 2024-03-06 DIAGNOSIS — E53.8 VITAMIN B 12 DEFICIENCY: ICD-10-CM

## 2024-03-06 DIAGNOSIS — E78.2 MIXED HYPERLIPIDEMIA: ICD-10-CM

## 2024-03-06 DIAGNOSIS — I10 PRIMARY HYPERTENSION: ICD-10-CM

## 2024-03-06 DIAGNOSIS — E55.9 VITAMIN D DEFICIENCY: ICD-10-CM

## 2024-03-06 PROCEDURE — 99214 OFFICE O/P EST MOD 30 MIN: CPT | Performed by: INTERNAL MEDICINE

## 2024-03-06 PROCEDURE — 95251 CONT GLUC MNTR ANALYSIS I&R: CPT | Performed by: INTERNAL MEDICINE

## 2024-03-06 RX ORDER — METFORMIN HYDROCHLORIDE 500 MG/1
1000 TABLET, EXTENDED RELEASE ORAL
Qty: 30 TABLET | Refills: 5 | Status: SHIPPED | OUTPATIENT
Start: 2024-03-06

## 2024-03-06 RX ORDER — INSULIN GLARGINE 300 U/ML
INJECTION, SOLUTION SUBCUTANEOUS
Start: 2024-03-06 | End: 2024-03-06 | Stop reason: SDUPTHER

## 2024-03-06 NOTE — PROGRESS NOTES
Abdi Monteiro 58 y.o. male MRN: [id number]    Encounter: [id number]      Assessment/Plan     Assessment:  This is a 58 y.o.-year-old male with diabetes with hyperglycemia.    Plan:    Diagnoses and all orders for this visit:    Type 2 diabetes mellitus with hyperglycemia, without long-term current use of insulin ([facility])  Lab Results   Component Value Date    HGBA1C 8.1 (H) 03/04/2024   A1c is 8.1%, improved.  This is still uncontrolled, goal for A1c is 6.5%  Will increase metformin to 1000 mg with breakfast, along with Janumet 100/1000 with breakfast  Continue Prandin 2 g with breakfast, lunch and dinner, old blood sugar is less than 80 or not eating  Increase Toujeo to 16 units at dinner time  If fasting blood sugar is still about 120 mg per DL after 3 days, dose can be increased to 18 units at dinner    Discussed about long-term effects of uncontrolled diabetes, bleeding risk of heart disease, stroke, nephropathy neuropathy and retinopathy.  Is the importance of making follow-up appointment with ophthalmology and get yearly eye checkup for ruling out retinopathy.  Continue to monitor A1c every 3 to 4 months, complains of metabolic profile every 3 to 4 months  Work ordered  Educated about hypoglycemia symptoms and treatment  -     insulin glargine (Toujeo SoloStar) 300 units/mL CONCENTRATED U-300 injection pen (1-unit dial); Inject 16 units at 6 PM  -     Basic metabolic panel; Future  -     Comprehensive metabolic panel; Future  -     Hemoglobin A1C; Future  -     Albumin / creatinine urine ratio; Future  -     metFORMIN (GLUCOPHAGE-XR) 500 mg 24 hr tablet; Take 2 tablets (1,000 mg total) by mouth daily with breakfast    Vitamin B 12 deficiency  Vitamin B12 is very low, can start vitamin B12 supplementation 500 mcg daily  -     Vitamin B12; Future    Primary hypertension  Blood pressure is usually well-controlled, continue current management.  Managed by primary care physician  Vitamin D deficiency  Discussed to  take vitamin D3 supplementation 2000 IU daily, vitamin D is very low  Discussed the effect of low vitamin D on bones and other medical health  Mixed hyperlipidemia    Continue statins    CC: Diabetes    History of Present Illness     HPI:    Abdi Monteiro is a 58-year-old male with medical history of type 2 diabetes for 8 years, managed on basal insulin as well as oral medications, hypertension and hypercholesterolemia is here for follow-up/  Diabetes course has been stable./  Last A1c was 9.8% in December 2022  A1c was 8.5% in November 2023  Most recent A1c is 8.1%    Current regimen includes Lantus 14 units at bedtime, Prandin 2 mg with breakfast, lunch and dinner, Janumet 100/1000, 1 tablet in the morning and 500 at dinnertime    He admits compliance with the medications.  Denies side effects.  He uses continuous glucose monitor sensor by katiana regularly.  2 weeks ago review from February 22 to March 6, 2024 reviewed.  CGM active is 93%.  Average glucose is 177 mg per DL, GMI is 7.5%, glucose variability is 26.8%  59% blood sugars are in target range, 33% blood sugars are high, 8% blood sugars are very high, 0% blood sugars are low.  There is a pattern of high blood sugars sometimes in between 9 AM to 12 PM and again after 7 PM to 12 AM specially after breakfast and dinner    Hyperlipidemia, he takes Lipitor 10 mg daily  For hypertension he takes amlodipine/benazepril 5/20 mg 1 capsule daily    Component      Latest Ref Rng 3/4/2024   GLUCOSE      65 - 99 mg/dL 128 (H)    BUN      7 - 28 mg/dL 8    Creatinine      0.53 - 1.30 mg/dL 0.83    Sodium      135 - 145 mmol/L 134 (L)    Potassium      3.5 - 5.2 mmol/L 4.2    Chloride      100 - 109 mmol/L 97 (L)    Carbon Dioxide      21 - 31 mmol/L 22    Calcium      8.5 - 10.1 mg/dL 9.1    ALK PHOS      35 - 120 U/L 54    Albumin      3.5 - 5.7 g/dL 4.4    Total Bilirubin      0.2 - 1.0 mg/dL 1.1 (H)    Total Protein      6.3 - 8.3 g/dL 7.0    AST      <41 U/L 19     ALT      <56 U/L 24    ANION GAP      3 - 11  15 (H)    GFR, Calculated      >59  102    Comment (Note)    Hemoglobin A1C      <5.7 % 8.1 (H)    eAG, EST AVG Glucose      mg/dL 186    25-Hydroxy, Vitamin D      30 - 100 ng/mL 15 (L)    Vitamin B-12      180 - 914 pg/mL 183         Review of Systems   Constitutional:  Negative for activity change, diaphoresis, fatigue, fever and unexpected weight change.   HENT: Negative.     Eyes:  Negative for visual disturbance.   Respiratory:  Negative for cough, chest tightness and shortness of breath.    Cardiovascular:  Negative for chest pain, palpitations and leg swelling.   Gastrointestinal:  Negative for abdominal pain, blood in stool, constipation, diarrhea, nausea and vomiting.   Endocrine: Negative for cold intolerance, heat intolerance, polydipsia, polyphagia and polyuria.   Genitourinary:  Negative for dysuria, enuresis, frequency and urgency.   Musculoskeletal:  Negative for arthralgias and myalgias.   Skin:  Negative for pallor, rash and wound.   Allergic/Immunologic: Negative.    Neurological:  Negative for dizziness, tremors, weakness and numbness.   Hematological: Negative.    Psychiatric/Behavioral: Negative.         Historical Information   No past medical history on file.  No past surgical history on file.  Social History   Social History     Substance and Sexual Activity   Alcohol Use Yes    Alcohol/week: 2.0 standard drinks of alcohol    Types: 2 Glasses of wine per week    Comment: social     Social History     Substance and Sexual Activity   Drug Use Never     Social History     Tobacco Use   Smoking Status Never   Smokeless Tobacco Never     Family History:   Family History   Problem Relation Age of Onset    Diabetes type II Mother     Hypertension Mother        Meds/Allergies   Current Outpatient Medications   Medication Sig Dispense Refill    amLODIPine-benazepril (LOTREL 5-20) 5-20 MG per capsule Take 1 capsule by mouth daily      atorvastatin  (LIPITOR) 10 mg tablet Take one tablet at night 90 tablet 1    BD Pen Needle Caroline 2nd Gen 32G X 4 MM MISC USE ONCE DAILY IN THE MORNING 100 each 0    Continuous Blood Gluc Sensor (FreeStyle Verito 3 Sensor) MISC Use 1 sensor every 2 weeks 2 each 5    insulin glargine (Toujeo SoloStar) 300 units/mL CONCENTRATED U-300 injection pen (1-unit dial) Inject 16 units at 6 PM      Janumet -1000 MG TB24 TAKE 1 TABLET BY MOUTH EVERY DAY WITH BREAKFAST 90 tablet 1    metFORMIN (GLUCOPHAGE-XR) 500 mg 24 hr tablet TAKE 1 TABLET BY MOUTH EVERY DAY WITH DINNER 90 tablet 0    repaglinide (PRANDIN) 2 mg tablet TAKE 1 TABLET BY MOUTH WITH BREAKFAST THEN 1 TABLET WITH LUNCH AND 1 TABLET WITH DINNER. HOLD IF BLOOD SUGARS LESS THAN 100MG / DL 90 tablet 0    Continuous Blood Gluc Sensor (FreeStyle Verito 2 Sensor) MISC USE EVERY 14 DAYS (Patient not taking: Reported on 3/6/2024)       No current facility-administered medications for this visit.     No Known Allergies    Objective   Vitals: Blood pressure 138/88, pulse 99, weight 68 kg (150 lb), SpO2 99%.    Physical Exam  Vitals reviewed.   Constitutional:       General: He is not in acute distress.     Appearance: Normal appearance. He is well-developed and normal weight.   HENT:      Head: Normocephalic and atraumatic.   Eyes:      Pupils: Pupils are equal, round, and reactive to light.   Neck:      Thyroid: No thyromegaly.   Cardiovascular:      Rate and Rhythm: Normal rate and regular rhythm.      Heart sounds: Normal heart sounds.   Pulmonary:      Effort: Pulmonary effort is normal. No respiratory distress.      Breath sounds: Normal breath sounds.   Musculoskeletal:         General: No deformity. Normal range of motion.      Cervical back: Normal range of motion and neck supple.      Right lower leg: No edema.      Left lower leg: No edema.   Skin:     General: Skin is warm and dry.      Findings: No erythema.   Neurological:      Mental Status: He is alert and oriented to  "person, place, and time.         The history was obtained from the review of the chart, patient.    Lab Results:   Lab Results   Component Value Date/Time    Hemoglobin A1C 8.1 (H) 03/04/2024 06:59 AM    Hemoglobin A1C 8.5 10/16/2023 12:00 AM    BUN 8 03/04/2024 06:59 AM    Potassium 4.2 03/04/2024 06:59 AM    Chloride 97 (L) 03/04/2024 06:59 AM    CO2 22 03/04/2024 06:59 AM    Creatinine 0.83 03/04/2024 06:59 AM    AST 19 03/04/2024 06:59 AM    ALT 24 03/04/2024 06:59 AM    Protein, Total 7.0 03/04/2024 06:59 AM    Albumin 4.4 03/04/2024 06:59 AM           Imaging Studies: I have personally reviewed pertinent reports.      Portions of the record may have been created with voice recognition software. Occasional wrong word or \"sound a like\" substitutions may have occurred due to the inherent limitations of voice recognition software. Read the chart carefully and recognize, using context, where substitutions have occurred.    "

## 2024-03-07 RX ORDER — INSULIN GLARGINE 300 U/ML
INJECTION, SOLUTION SUBCUTANEOUS
Qty: 4.5 ML | Refills: 0 | Status: SHIPPED | OUTPATIENT
Start: 2024-03-07

## 2024-03-17 DIAGNOSIS — E11.65 TYPE 2 DIABETES MELLITUS WITH HYPERGLYCEMIA, WITHOUT LONG-TERM CURRENT USE OF INSULIN (HCC): ICD-10-CM

## 2024-03-18 RX ORDER — REPAGLINIDE 2 MG/1
TABLET ORAL
Qty: 90 TABLET | Refills: 1 | Status: SHIPPED | OUTPATIENT
Start: 2024-03-18

## 2024-05-07 DIAGNOSIS — E11.65 TYPE 2 DIABETES MELLITUS WITH HYPERGLYCEMIA, WITHOUT LONG-TERM CURRENT USE OF INSULIN (HCC): ICD-10-CM

## 2024-05-07 RX ORDER — PEN NEEDLE, DIABETIC 32GX 5/32"
NEEDLE, DISPOSABLE MISCELLANEOUS
Qty: 100 EACH | Refills: 1 | Status: SHIPPED | OUTPATIENT
Start: 2024-05-07

## 2024-05-09 ENCOUNTER — TELEPHONE (OUTPATIENT)
Age: 59
End: 2024-05-09

## 2024-05-09 DIAGNOSIS — E11.65 TYPE 2 DIABETES MELLITUS WITH HYPERGLYCEMIA, WITHOUT LONG-TERM CURRENT USE OF INSULIN (HCC): ICD-10-CM

## 2024-05-09 RX ORDER — BLOOD-GLUCOSE SENSOR
EACH MISCELLANEOUS
Qty: 2 EACH | Refills: 5 | Status: SHIPPED | OUTPATIENT
Start: 2024-05-09

## 2024-05-09 NOTE — TELEPHONE ENCOUNTER
PA for freestyle katiana 3 sensor    Submitted via    []CMM-KEY    [][employer]-Case ID #  [id number]      []Faxed to plan   []Other website    []Phone call Case ID #      Office notes sent, clinical questions answered. Awaiting determination    Turnaround time for your insurance to make a decision on your Prior Authorization can take 7-21 business days.

## 2024-05-13 DIAGNOSIS — E11.65 TYPE 2 DIABETES MELLITUS WITH HYPERGLYCEMIA, WITHOUT LONG-TERM CURRENT USE OF INSULIN (HCC): ICD-10-CM

## 2024-05-13 RX ORDER — REPAGLINIDE 2 MG/1
TABLET ORAL
Qty: 90 TABLET | Refills: 5 | Status: SHIPPED | OUTPATIENT
Start: 2024-05-13

## 2024-06-11 DIAGNOSIS — E11.65 TYPE 2 DIABETES MELLITUS WITH HYPERGLYCEMIA, WITHOUT LONG-TERM CURRENT USE OF INSULIN (HCC): ICD-10-CM

## 2024-06-11 RX ORDER — INSULIN GLARGINE 300 U/ML
INJECTION, SOLUTION SUBCUTANEOUS
Qty: 4.5 ML | Refills: 1 | Status: SHIPPED | OUTPATIENT
Start: 2024-06-11

## 2024-08-02 LAB
ALBUMIN SERPL-MCNC: 4.3 G/DL (ref 3.5–5.7)
ALBUMIN/CREAT UR: 54.6
ALP SERPL-CCNC: 58 U/L (ref 35–120)
ALT SERPL-CCNC: 22 U/L
ANION GAP SERPL CALCULATED.3IONS-SCNC: 9 MMOL/L (ref 3–11)
AST SERPL-CCNC: 17 U/L
BILIRUB SERPL-MCNC: 1.1 MG/DL (ref 0.2–1)
BUN SERPL-MCNC: 8 MG/DL (ref 7–28)
CALCIUM SERPL-MCNC: 9.1 MG/DL (ref 8.5–10.1)
CHLORIDE SERPL-SCNC: 97 MMOL/L (ref 100–109)
CO2 SERPL-SCNC: 28 MMOL/L (ref 21–31)
CREAT SERPL-MCNC: 1.01 MG/DL (ref 0.53–1.3)
CREAT UR-MCNC: 65.9 MG/DL (ref 50–200)
CYTOLOGY CMNT CVX/VAG CYTO-IMP: ABNORMAL
EST. AVERAGE GLUCOSE BLD GHB EST-MCNC: 183 MG/DL
GFR/BSA.PRED SERPLBLD CYS-BASED-ARV: 86 ML/MIN/{1.73_M2}
GLUCOSE SERPL-MCNC: 150 MG/DL (ref 65–99)
HBA1C MFR BLD: 8 %
MICROALBUMIN UR-MCNC: 3.6 MG/DL
POTASSIUM SERPL-SCNC: 4.8 MMOL/L (ref 3.5–5.2)
PROT SERPL-MCNC: 7 G/DL (ref 6.3–8.3)
SODIUM SERPL-SCNC: 134 MMOL/L (ref 135–145)
VIT B12 SERPL-MCNC: 256 PG/ML (ref 180–914)

## 2024-08-06 ENCOUNTER — OFFICE VISIT (OUTPATIENT)
Dept: ENDOCRINOLOGY | Facility: CLINIC | Age: 59
End: 2024-08-06
Payer: COMMERCIAL

## 2024-08-06 VITALS
BODY MASS INDEX: 20.72 KG/M2 | HEART RATE: 84 BPM | SYSTOLIC BLOOD PRESSURE: 170 MMHG | DIASTOLIC BLOOD PRESSURE: 98 MMHG | WEIGHT: 148 LBS | HEIGHT: 71 IN | OXYGEN SATURATION: 99 %

## 2024-08-06 DIAGNOSIS — E11.65 TYPE 2 DIABETES MELLITUS WITH HYPERGLYCEMIA, WITHOUT LONG-TERM CURRENT USE OF INSULIN (HCC): Primary | ICD-10-CM

## 2024-08-06 DIAGNOSIS — E53.8 VITAMIN B 12 DEFICIENCY: ICD-10-CM

## 2024-08-06 DIAGNOSIS — E78.2 MIXED HYPERLIPIDEMIA: ICD-10-CM

## 2024-08-06 DIAGNOSIS — I10 PRIMARY HYPERTENSION: ICD-10-CM

## 2024-08-06 PROCEDURE — 95251 CONT GLUC MNTR ANALYSIS I&R: CPT | Performed by: INTERNAL MEDICINE

## 2024-08-06 PROCEDURE — 99214 OFFICE O/P EST MOD 30 MIN: CPT | Performed by: INTERNAL MEDICINE

## 2024-08-06 RX ORDER — ATORVASTATIN CALCIUM 10 MG/1
TABLET, FILM COATED ORAL
Qty: 90 TABLET | Refills: 1 | Status: SHIPPED | OUTPATIENT
Start: 2024-08-06

## 2024-08-06 RX ORDER — METFORMIN HYDROCHLORIDE 500 MG/1
500 TABLET, EXTENDED RELEASE ORAL
Start: 2024-08-06

## 2024-08-06 RX ORDER — REPAGLINIDE 2 MG/1
TABLET ORAL
Qty: 200 TABLET | Refills: 1 | Status: SHIPPED | OUTPATIENT
Start: 2024-08-06

## 2024-08-06 RX ORDER — SITAGLIPTIN AND METFORMIN HYDROCHLORIDE 1000; 100 MG/1; MG/1
TABLET, FILM COATED, EXTENDED RELEASE ORAL
Qty: 90 TABLET | Refills: 1 | Status: SHIPPED | OUTPATIENT
Start: 2024-08-06

## 2024-08-06 RX ORDER — REPAGLINIDE 2 MG/1
TABLET ORAL
Qty: 200 TABLET | Refills: 1 | Status: SHIPPED | OUTPATIENT
Start: 2024-08-06 | End: 2024-08-06 | Stop reason: SDUPTHER

## 2024-08-06 RX ORDER — REPAGLINIDE 2 MG/1
TABLET ORAL
Start: 2024-08-06 | End: 2024-08-06 | Stop reason: SDUPTHER

## 2024-08-06 RX ORDER — INSULIN GLARGINE 300 U/ML
INJECTION, SOLUTION SUBCUTANEOUS
Qty: 4.5 ML | Refills: 1 | Status: SHIPPED | OUTPATIENT
Start: 2024-08-06

## 2024-08-06 NOTE — PROGRESS NOTES
Abdi Monteiro 58 y.o. male MRN: [id number]    Encounter: [id number]      Assessment & Plan     Assessment:  This is a 58 y.o.-year-old male with diabetes with hyperglycemia.    Plan:  Diagnoses and all orders for this visit:    Type 2 diabetes mellitus with hyperglycemia, without long-term current use of insulin ([facility])    Lab Results   Component Value Date    HGBA1C 8.0 (H) 08/02/2024     A1c is 8.0%, Uncontrolled   Restart metformin Xr 500 mg with dinner   Discussed to take Prandin half tablet with lunch and dinner if eating small meals, continue 1 tablet with breakfast  Discussed to keep carbohydrate consistent with meals  Considering patient's BMI of 20, Jardiance would not be a good option as able to lose more weight  Discussed to incorporate proteins with each meal  Educated about hypoglycemia symptoms and treatment  Discussed the importance of follow-up with ophthalmology and podiatry      -     metFORMIN (GLUCOPHAGE-XR) 500 mg 24 hr tablet; Take 1 tablet (500 mg total) by mouth daily with dinner  -     Discontinue: repaglinide (PRANDIN) 2 mg tablet; TAKE 1 TABLET BY MOUTH WITH BREAKFAST THEN 1 TABLET WITH LUNCH AND 1/2 TABLET WITH DINNER. HOLD IF BLOOD SUGARS LESS THAN 100MG / DL  -     Comprehensive metabolic panel; Future  -     Albumin / creatinine urine ratio; Future  -     Hemoglobin A1C; Future  -     C-peptide; Future  -     repaglinide (PRANDIN) 2 mg tablet; TAKE 1 TABLET BY MOUTH WITH BREAKFAST THEN 1/2 TABLET WITH LUNCH AND 1/2 TABLET WITH DINNER. HOLD IF BLOOD SUGARS LESS THAN 100MG / DL    Vitamin B 12 deficiency  Discussed to take vitamin B12 supplementation 500 mcg daily  Primary hypertension  Blood pressure is elevated today, goal for blood pressure is less than 130/80 mmHg  Follow-up with cardiologist/PCP  Continue exercise regimen, low-salt diet  Mixed hyperlipidemia   Previously LDL was at goal.  Profile was ordered however it was not completed  Currently managed on statins, Lipitor 10 mg  daily    CC: Diabetes    History of Present Illness     HPI:  Abdi [place] is 58-year-old male with medical history of type 2 diabetes for 8 years, managed on basal insulin as well as oral medications, hypertension, hypercholesteremia, microalbuminuria is here for follow-up.  His course has been stable.  A1c improved to 8.0%  He is using continuous glucose monitor sensor regularly  2 weeks overview from July 24 to August 6, 2024 reviewed, more than 72 hours of data reviewed.  Average glucose 166 mg per DL, GMI 7.3%, glucose variability is 30.3%  Target range, 64%  Low blood sugar, 1%  High blood sugars 28%  Very high blood sugar about target 7%    Patient has a pattern of usually elevated blood sugars after breakfast, appearing hypoglycemic episodes where between 3 PM to 12 AM  Usually patient goes for 3 to 4 mile walk after dinner and that is when the blood sugar is dropping    Current regimen includes Lantus 16 units at bedtime, Prandin 2 mg with breakfast, lunch and dinner, Janumet 100/1000 mg in the morning and 500 at dinnertime  Per patient has not been taking dinnertime metformin    For hyperlipidemia he takes Lipitor 10 mg daily  For hypertension, he takes amlodipine/benazepril 5/20 mg 1 capsule daily    Component      Latest Ref Rng 8/2/2024   GLUCOSE      65 - 99 mg/dL 150 (H)    BUN      7 - 28 mg/dL 8    Creatinine      0.53 - 1.30 mg/dL 1.01    Sodium      135 - 145 mmol/L 134 (L)    Potassium      3.5 - 5.2 mmol/L 4.8    Chloride      100 - 109 mmol/L 97 (L)    Carbon Dioxide      21 - 31 mmol/L 28    Calcium      8.5 - 10.1 mg/dL 9.1    ALK PHOS      35 - 120 U/L 58    Albumin      3.5 - 5.7 g/dL 4.3    Total Bilirubin      0.2 - 1.0 mg/dL 1.1 (H)    Total Protein      6.3 - 8.3 g/dL 7.0    AST      <41 U/L 17    ALT      <56 U/L 22    ANION GAP      3 - 11  9    GFR, Calculated      >59  86    Comment (Note)    EXT Creatinine Urine      50.0 - 200.0 mg/dL 65.9    Albumin,U,Random      <3.0 mg/dL  3.6 (H)    Albumin Creat Ratio      <30.0 mg/gm CREA 54.6 (H)    Hemoglobin A1C      <5.7 % 8.0 (H)    eAG, EST AVG Glucose      mg/dL 183    Vitamin B-12      180 - 914 pg/mL 256         Lab Results   Component Value Date    HGBA1C 8.0 (H) 08/02/2024          Review of Systems   Constitutional:  Negative for activity change, diaphoresis, fatigue, fever and unexpected weight change.   HENT: Negative.     Eyes:  Negative for visual disturbance.   Respiratory:  Negative for cough, chest tightness and shortness of breath.    Cardiovascular:  Negative for chest pain, palpitations and leg swelling.   Gastrointestinal:  Negative for abdominal pain, blood in stool, constipation, diarrhea, nausea and vomiting.   Endocrine: Negative for cold intolerance, heat intolerance, polydipsia, polyphagia and polyuria.   Genitourinary:  Negative for dysuria, enuresis, frequency and urgency.   Musculoskeletal:  Negative for arthralgias and myalgias.   Skin:  Negative for pallor, rash and wound.   Allergic/Immunologic: Negative.    Neurological:  Negative for dizziness, tremors, weakness and numbness.   Hematological: Negative.    Psychiatric/Behavioral: Negative.         Historical Information   History reviewed. No pertinent past medical history.  History reviewed. No pertinent surgical history.  Social History   Social History     Substance and Sexual Activity   Alcohol Use Yes    Alcohol/week: 2.0 standard drinks of alcohol    Types: 2 Glasses of wine per week    Comment: social     Social History     Substance and Sexual Activity   Drug Use Never     Social History     Tobacco Use   Smoking Status Never   Smokeless Tobacco Never     Family History:   Family History   Problem Relation Age of Onset    Diabetes type II Mother     Hypertension Mother        Meds/Allergies   Current Outpatient Medications   Medication Sig Dispense Refill    amLODIPine-benazepril (LOTREL 5-20) 5-20 MG per capsule Take 1 capsule by mouth daily       "atorvastatin (LIPITOR) 10 mg tablet Take one tablet at night 90 tablet 1    Continuous Glucose Sensor (FreeStyle Verito 3 Sensor) MISC PLACE 1 SENSOR ON SKIN AND CHANGE EVERY 2 WEEKS 2 each 5    insulin glargine (Toujeo SoloStar) 300 units/mL CONCENTRATED U-300 injection pen (1-unit dial) Inject 16 units at 6 PM 4.5 mL 1    Insulin Pen Needle (BD Pen Needle Caroline 2nd Gen) 32G X 4 MM MISC USE EVERY DAY IN THE MORNING 100 each 1    Janumet -1000 MG TB24 TAKE 1 TABLET BY MOUTH EVERY DAY WITH BREAKFAST 90 tablet 1    metFORMIN (GLUCOPHAGE-XR) 500 mg 24 hr tablet Take 1 tablet (500 mg total) by mouth daily with dinner      repaglinide (PRANDIN) 2 mg tablet TAKE 1 TABLET BY MOUTH WITH BREAKFAST THEN 1/2 TABLET WITH LUNCH AND 1/2 TABLET WITH DINNER. HOLD IF BLOOD SUGARS LESS THAN 100MG /  tablet 1    Continuous Blood Gluc Sensor (FreeStyle Verito 2 Sensor) MISC USE EVERY 14 DAYS (Patient not taking: Reported on 3/6/2024)       No current facility-administered medications for this visit.     No Known Allergies    Objective   Vitals: Blood pressure 170/98, pulse 84, height 5' 11\" (1.803 m), weight 67.1 kg (148 lb), SpO2 99%.    Physical Exam  Vitals reviewed.   Constitutional:       General: He is not in acute distress.     Appearance: Normal appearance. He is not ill-appearing.   HENT:      Head: Normocephalic and atraumatic.      Nose: Nose normal.   Eyes:      Extraocular Movements: Extraocular movements intact.      Conjunctiva/sclera: Conjunctivae normal.   Pulmonary:      Effort: No respiratory distress.   Musculoskeletal:      Cervical back: Normal range of motion.   Neurological:      General: No focal deficit present.      Mental Status: He is alert and oriented to person, place, and time.   Psychiatric:         Mood and Affect: Mood normal.         Behavior: Behavior normal.         The history was obtained from the review of the chart, patient.    Lab Results:   Lab Results   Component Value Date/Time    " "Hemoglobin A1C 8.0 (H) 08/02/2024 07:09 AM    Hemoglobin A1C 8.1 (H) 03/04/2024 06:59 AM    Hemoglobin A1C 8.5 10/16/2023 12:00 AM    BUN 8 08/02/2024 07:09 AM    BUN 8 03/04/2024 06:59 AM    Potassium 4.8 08/02/2024 07:09 AM    Potassium 4.2 03/04/2024 06:59 AM    Chloride 97 (L) 08/02/2024 07:09 AM    Chloride 97 (L) 03/04/2024 06:59 AM    CO2 28 08/02/2024 07:09 AM    CO2 22 03/04/2024 06:59 AM    Creatinine 1.01 08/02/2024 07:09 AM    Creatinine 0.83 03/04/2024 06:59 AM    AST 17 08/02/2024 07:09 AM    AST 19 03/04/2024 06:59 AM    ALT 22 08/02/2024 07:09 AM    ALT 24 03/04/2024 06:59 AM    Protein, Total 7.0 08/02/2024 07:09 AM    Protein, Total 7.0 03/04/2024 06:59 AM    Albumin 4.3 08/02/2024 07:09 AM    Albumin 4.4 03/04/2024 06:59 AM           Imaging Studies: I have personally reviewed pertinent reports.      Portions of the record may have been created with voice recognition software. Occasional wrong word or \"sound a like\" substitutions may have occurred due to the inherent limitations of voice recognition software. Read the chart carefully and recognize, using context, where substitutions have occurred.    "

## 2024-11-17 DIAGNOSIS — E11.65 TYPE 2 DIABETES MELLITUS WITH HYPERGLYCEMIA, WITHOUT LONG-TERM CURRENT USE OF INSULIN (HCC): ICD-10-CM

## 2024-11-18 RX ORDER — ACYCLOVIR 800 MG/1
TABLET ORAL
Qty: 2 EACH | Refills: 5 | Status: SHIPPED | OUTPATIENT
Start: 2024-11-18

## 2024-11-19 DIAGNOSIS — E11.65 TYPE 2 DIABETES MELLITUS WITH HYPERGLYCEMIA, WITHOUT LONG-TERM CURRENT USE OF INSULIN (HCC): ICD-10-CM

## 2024-11-20 RX ORDER — SITAGLIPTIN AND METFORMIN HYDROCHLORIDE 1000; 100 MG/1; MG/1
TABLET, FILM COATED, EXTENDED RELEASE ORAL
Qty: 90 TABLET | Refills: 1 | Status: SHIPPED | OUTPATIENT
Start: 2024-11-20

## 2024-11-30 DIAGNOSIS — E11.65 TYPE 2 DIABETES MELLITUS WITH HYPERGLYCEMIA, WITHOUT LONG-TERM CURRENT USE OF INSULIN (HCC): ICD-10-CM

## 2024-12-02 RX ORDER — PEN NEEDLE, DIABETIC 32GX 5/32"
NEEDLE, DISPOSABLE MISCELLANEOUS
Qty: 100 EACH | Refills: 1 | Status: SHIPPED | OUTPATIENT
Start: 2024-12-02

## 2025-01-13 ENCOUNTER — TELEPHONE (OUTPATIENT)
Age: 60
End: 2025-01-13

## 2025-01-13 NOTE — TELEPHONE ENCOUNTER
Pt called asking to have labs faxed to \[facility]\"" labs   Faxed to [phone]

## 2025-01-14 LAB
ALBUMIN SERPL-MCNC: 4.3 G/DL (ref 3.5–5.7)
ALBUMIN/CREAT UR: 71.9
ALP SERPL-CCNC: 57 U/L (ref 35–120)
ALT SERPL-CCNC: 26 U/L
ANION GAP SERPL CALCULATED.3IONS-SCNC: 13 MMOL/L (ref 3–11)
AST SERPL-CCNC: 20 U/L
BILIRUB SERPL-MCNC: 1.1 MG/DL (ref 0.2–1)
BUN SERPL-MCNC: 7 MG/DL (ref 7–28)
CALCIUM SERPL-MCNC: 9.1 MG/DL (ref 8.5–10.5)
CHLORIDE SERPL-SCNC: 96 MMOL/L (ref 100–109)
CHOLEST SERPL-MCNC: 174 MG/DL
CHOLEST/HDLC SERPL: 2.8 {RATIO}
CO2 SERPL-SCNC: 27 MMOL/L (ref 21–31)
CREAT SERPL-MCNC: 0.97 MG/DL (ref 0.53–1.3)
CREAT UR-MCNC: 27.8 MG/DL (ref 50–200)
CYTOLOGY CMNT CVX/VAG CYTO-IMP: ABNORMAL
EST. AVERAGE GLUCOSE BLD GHB EST-MCNC: 183 MG/DL
GFR/BSA.PRED SERPLBLD CYS-BASED-ARV: 90 ML/MIN/{1.73_M2}
GLUCOSE SERPL-MCNC: 132 MG/DL (ref 65–99)
HBA1C MFR BLD: 8 %
HDLC SERPL-MCNC: 63 MG/DL (ref 23–92)
LDLC SERPL CALC-MCNC: 97 MG/DL
MICROALBUMIN UR-MCNC: 2 MG/DL
NONHDLC SERPL-MCNC: 111 MG/DL
POTASSIUM SERPL-SCNC: 4.5 MMOL/L (ref 3.5–5.2)
PROT SERPL-MCNC: 7.2 G/DL (ref 6.3–8.3)
SODIUM SERPL-SCNC: 136 MMOL/L (ref 135–145)
TRIGL SERPL-MCNC: 68 MG/DL

## 2025-01-15 ENCOUNTER — OFFICE VISIT (OUTPATIENT)
Dept: ENDOCRINOLOGY | Facility: CLINIC | Age: 60
End: 2025-01-15
Payer: COMMERCIAL

## 2025-01-15 VITALS
BODY MASS INDEX: 20.58 KG/M2 | SYSTOLIC BLOOD PRESSURE: 136 MMHG | HEART RATE: 78 BPM | DIASTOLIC BLOOD PRESSURE: 86 MMHG | HEIGHT: 71 IN | OXYGEN SATURATION: 98 % | WEIGHT: 147 LBS

## 2025-01-15 DIAGNOSIS — E11.65 TYPE 2 DIABETES MELLITUS WITH HYPERGLYCEMIA, WITHOUT LONG-TERM CURRENT USE OF INSULIN (HCC): ICD-10-CM

## 2025-01-15 DIAGNOSIS — E78.5 HYPERLIPIDEMIA ASSOCIATED WITH TYPE 2 DIABETES MELLITUS  (HCC): ICD-10-CM

## 2025-01-15 DIAGNOSIS — E78.2 MIXED HYPERLIPIDEMIA: ICD-10-CM

## 2025-01-15 DIAGNOSIS — E11.69 HYPERLIPIDEMIA ASSOCIATED WITH TYPE 2 DIABETES MELLITUS  (HCC): ICD-10-CM

## 2025-01-15 DIAGNOSIS — E53.8 VITAMIN B 12 DEFICIENCY: ICD-10-CM

## 2025-01-15 DIAGNOSIS — I10 PRIMARY HYPERTENSION: ICD-10-CM

## 2025-01-15 DIAGNOSIS — E11.65 TYPE 2 DIABETES MELLITUS WITH HYPERGLYCEMIA, WITHOUT LONG-TERM CURRENT USE OF INSULIN (HCC): Primary | ICD-10-CM

## 2025-01-15 PROCEDURE — 99214 OFFICE O/P EST MOD 30 MIN: CPT | Performed by: INTERNAL MEDICINE

## 2025-01-15 PROCEDURE — 95251 CONT GLUC MNTR ANALYSIS I&R: CPT | Performed by: INTERNAL MEDICINE

## 2025-01-15 RX ORDER — REPAGLINIDE 2 MG/1
TABLET ORAL
Start: 2025-01-15 | End: 2025-01-15 | Stop reason: SDUPTHER

## 2025-01-15 RX ORDER — INSULIN GLARGINE 300 U/ML
INJECTION, SOLUTION SUBCUTANEOUS
Start: 2025-01-15 | End: 2025-01-15 | Stop reason: SDUPTHER

## 2025-01-15 NOTE — PROGRESS NOTES
Abdi Monteiro 59 y.o. male MRN: [id number]    Encounter: [id number]      Assessment & Plan     Assessment:  This is a 59 y.o.-year-old male with diabetes with hyperglycemia.    Plan:  Diagnoses and all orders for this visit:    Type 2 diabetes mellitus with hyperglycemia, without long-term current use of insulin ([facility])  Lab Results   Component Value Date    HGBA1C 8.0 (H) 01/14/2025   A1c is 8.0%, uncontrolled  Goal for A1c is 6.5 to 7%  Will increase Lantus to 18 units at night  Start Jardiance 25 mg daily, discussed the side effects and benefits of Jardiance.  Patient has positive microalbuminuria and would benefit from starting SGLT2 inhibitors.  Discussed the importance of keeping himself well-hydrated, also discussed the effect on blood pressure as well as weight  Educated about hypoglycemia symptoms and treatment  Will change Prandin to 2 mg 1 tablet with breakfast and half tablet with dinner  Discussed the importance of follow-up with ophthalmology regularly to rule out diabetic retinopathy.  Feet exam was done in the office  -     insulin glargine (Toujeo SoloStar) 300 units/mL CONCENTRATED U-300 injection pen (1-unit dial); Inject 18 units at 6 PM  -     Empagliflozin (Jardiance) 25 MG TABS; Take 1 tablet (25 mg total) by mouth every morning  -     repaglinide (PRANDIN) 2 mg tablet; Take one tablet with breakfast and 1/2 tablet with dinner ( hold if 100 or less than 100 mg/dl)  -     Hemoglobin A1C; Future  -     Albumin / creatinine urine ratio; Future  -     Comprehensive metabolic panel; Future  -     Basic metabolic panel; Future    Vitamin B 12 deficiency  Discussed the importance of taking vitamin B12 supplementation 500 mcg daily  -     Vitamin B12; Future    Mixed hyperlipidemia  Continue statins.  Continue lifestyle modification  Primary hypertension  Blood pressure is slightly elevated, continue current management.  Starting Jardiance will also help to reduce blood pressure.  Will repeat BMP in  1 month to assess GFR as well as electrolytes.  Hyperlipidemia associated with type 2 diabetes mellitus  (HCC)  Continue statins.  Reinforced lifestyle modifications       CC: Diabetes    History of Present Illness     HPI:  Abdi Monteiro is 59-year-old man with medical history of type 2 diabetes for 8 to 10 years, managed on basal insulin as well as oral medications, hypertension, hypercholesteremia, microalbuminuria is here for follow-up.  Diabetes course has been stable.  A1c was 9.8% in December 2022  A1c 8.5% in November 2023  Most recent A1c is 8.0%    Current regimen includes Lantus 16 units at bedtime, Janumet /1000 mg, 1 tablet breakfast, , metformin  mg with dinner  Prandin 2 mg, 1 tablet with breakfast, 1 tablet with lunch and half tablet with dinner.    He uses continuous glucose monitor sensor by [employer] 3.  2 weeks overview from January 2, 2025 to January 15, 2025 reviewed.  More than 72 hours of data reviewed.  Average glucose is 179 mg per DL, GMI is 7.6%, glucose variability is 30.6%.  57% blood sugars are in target range, 0% blood sugars are low, 31% blood sugars are high and 12% blood sugars are very high.  Patient has a pattern of elevated blood sugars usually after meals followed by low blood sugar    His weight has been stable.  For hypertension, he takes Lotrel 5/20 mg, 1 tablet daily  For hyperlipidemia, he takes Lipitor 10 mg daily    Lab Results   Component Value Date    HGBA1C 8.0 (H) 01/14/2025     Component      Latest Ref Rng 1/14/2025   GLUCOSE      65 - 99 mg/dL 132 (H)    BUN      7 - 28 mg/dL 7    Creatinine      0.53 - 1.30 mg/dL 0.97    Sodium      135 - 145 mmol/L 136    Potassium      3.5 - 5.2 mmol/L 4.5    Chloride      100 - 109 mmol/L 96 (L)    Carbon Dioxide      21 - 31 mmol/L 27    Calcium      8.5 - 10.5 mg/dL 9.1    ALK PHOS      35 - 120 U/L 57    Albumin      3.5 - 5.7 g/dL 4.3    Total Bilirubin      0.2 - 1.0 mg/dL 1.1 (H)    Total Protein      6.3 -  8.3 g/dL 7.2    AST      <41 U/L 20    ALT      <56 U/L 26    ANION GAP      3 - 11  13 (H)    GFR, Calculated      >59  90    Comment (Note)    Cholesterol      <200 mg/dL 174    Triglycerides      <150 mg/dL 68    HDL CHOLESTEROL LIPOPROTEIN      23 - 92 mg/dL 63    NON HDL CHOL. (LDL+VLDL)      <160 mg/dL 111    LDL Calculated      <130 mg/dL 97    Chol/HDL Ratio 2.8    EXT Creatinine Urine      50.0 - 200.0 mg/dL 27.8 (L)    Albumin,U,Random      <3.0 mg/dL 2.0    Albumin Creat Ratio      <30.0 mg/gm CREA 71.9 (H)    Hemoglobin A1C      <5.7 % 8.0 (H)    eAG, EST AVG Glucose      mg/dL 183    C-PEPTIDE      0.8 - 4.2 ng/mL 0.7 (L)              Review of Systems   Constitutional:  Negative for activity change, diaphoresis, fatigue, fever and unexpected weight change.   HENT: Negative.     Eyes:  Negative for visual disturbance.   Respiratory:  Negative for cough, chest tightness and shortness of breath.    Cardiovascular:  Negative for chest pain, palpitations and leg swelling.   Gastrointestinal:  Negative for abdominal pain, blood in stool, constipation, diarrhea, nausea and vomiting.   Endocrine: Negative for cold intolerance, heat intolerance, polydipsia, polyphagia and polyuria.   Genitourinary:  Negative for dysuria, enuresis, frequency and urgency.   Musculoskeletal:  Negative for arthralgias and myalgias.   Skin:  Negative for pallor, rash and wound.   Allergic/Immunologic: Negative.    Neurological:  Negative for dizziness, tremors, weakness and numbness.   Hematological: Negative.    Psychiatric/Behavioral: Negative.         Historical Information   No past medical history on file.  No past surgical history on file.  Social History   Social History     Substance and Sexual Activity   Alcohol Use Yes    Alcohol/week: 2.0 standard drinks of alcohol    Types: 2 Glasses of wine per week    Comment: social     Social History     Substance and Sexual Activity   Drug Use Never     Social History     Tobacco  "Use   Smoking Status Never   Smokeless Tobacco Never     Family History:   Family History   Problem Relation Age of Onset    Diabetes type II Mother     Hypertension Mother        Meds/Allergies   Current Outpatient Medications   Medication Sig Dispense Refill    amLODIPine-benazepril (LOTREL 5-20) 5-20 MG per capsule Take 1 capsule by mouth daily      atorvastatin (LIPITOR) 10 mg tablet Take one tablet at night 90 tablet 1    BD Pen Needle Caroline 2nd Gen 32G X 4 MM MISC USE EVERY DAY IN THE MORNING 100 each 1    Continuous Glucose Sensor (FreeStyle Verito 3 Sensor) MISC PLACE 1 SENSOR ON SKIN AND CHANGE EVERY 2 WEEKS 2 each 5    Empagliflozin (Jardiance) 25 MG TABS Take 1 tablet (25 mg total) by mouth every morning 30 tablet 5    insulin glargine (Toujeo SoloStar) 300 units/mL CONCENTRATED U-300 injection pen (1-unit dial) Inject 18 units at 6 PM      Janumet -1000 MG TB24 TAKE 1 TABLET BY MOUTH EVERY DAY WITH BREAKFAST 90 tablet 1    metFORMIN (GLUCOPHAGE-XR) 500 mg 24 hr tablet Take 1 tablet (500 mg total) by mouth daily with dinner      repaglinide (PRANDIN) 2 mg tablet Take one tablet with breakfast and 1/2 tablet with dinner ( hold if 100 or less than 100 mg/dl)      Continuous Blood Gluc Sensor (FreeStyle Verito 2 Sensor) MISC USE EVERY 14 DAYS (Patient not taking: Reported on 3/6/2024)       No current facility-administered medications for this visit.     No Known Allergies    Objective   Vitals: Blood pressure 136/86, pulse 78, height 5' 11\" (1.803 m), weight 66.7 kg (147 lb), SpO2 98%.    Physical Exam  Vitals reviewed.   Constitutional:       General: He is not in acute distress.     Appearance: He is well-developed.   HENT:      Head: Normocephalic and atraumatic.   Eyes:      Pupils: Pupils are equal, round, and reactive to light.   Neck:      Thyroid: No thyromegaly.   Cardiovascular:      Rate and Rhythm: Normal rate and regular rhythm.      Heart sounds: Normal heart sounds.   Pulmonary:      " "Effort: Pulmonary effort is normal. No respiratory distress.      Breath sounds: Normal breath sounds.   Musculoskeletal:         General: No deformity. Normal range of motion.      Cervical back: Normal range of motion and neck supple.   Skin:     General: Skin is warm and dry.      Findings: No erythema.   Neurological:      Mental Status: He is alert and oriented to person, place, and time.         The history was obtained from the review of the chart, patient.    Lab Results:   Lab Results   Component Value Date/Time    Hemoglobin A1C 8.0 (H) 01/14/2025 07:04 AM    Hemoglobin A1C 8.0 (H) 08/02/2024 07:09 AM    Hemoglobin A1C 8.1 (H) 03/04/2024 06:59 AM    BUN 7 01/14/2025 07:04 AM    BUN 8 08/02/2024 07:09 AM    BUN 8 03/04/2024 06:59 AM    Potassium 4.5 01/14/2025 07:04 AM    Potassium 4.8 08/02/2024 07:09 AM    Potassium 4.2 03/04/2024 06:59 AM    Chloride 96 (L) 01/14/2025 07:04 AM    Chloride 97 (L) 08/02/2024 07:09 AM    Chloride 97 (L) 03/04/2024 06:59 AM    CO2 27 01/14/2025 07:04 AM    CO2 28 08/02/2024 07:09 AM    CO2 22 03/04/2024 06:59 AM    Creatinine 0.97 01/14/2025 07:04 AM    Creatinine 1.01 08/02/2024 07:09 AM    Creatinine 0.83 03/04/2024 06:59 AM    AST 20 01/14/2025 07:04 AM    AST 17 08/02/2024 07:09 AM    AST 19 03/04/2024 06:59 AM    ALT 26 01/14/2025 07:04 AM    ALT 22 08/02/2024 07:09 AM    ALT 24 03/04/2024 06:59 AM    Protein, Total 7.2 01/14/2025 07:04 AM    Protein, Total 7.0 08/02/2024 07:09 AM    Protein, Total 7.0 03/04/2024 06:59 AM    Albumin 4.3 01/14/2025 07:04 AM    Albumin 4.3 08/02/2024 07:09 AM    Albumin 4.4 03/04/2024 06:59 AM    HDL Cholesterol 63 01/14/2025 07:04 AM    Triglycerides 68 01/14/2025 07:04 AM           Imaging Studies: Results Review Statement: No pertinent imaging studies reviewed.    Portions of the record may have been created with voice recognition software. Occasional wrong word or \"sound a like\" substitutions may have occurred due to the inherent " limitations of voice recognition software. Read the chart carefully and recognize, using context, where substitutions have occurred.

## 2025-01-16 LAB — C PEPTIDE SERPL-MCNC: 0.7 NG/ML (ref 0.8–4.2)

## 2025-01-16 RX ORDER — REPAGLINIDE 2 MG/1
TABLET ORAL
Qty: 135 TABLET | Refills: 1 | Status: SHIPPED | OUTPATIENT
Start: 2025-01-16 | End: 2026-01-16

## 2025-01-16 RX ORDER — INSULIN GLARGINE 300 U/ML
INJECTION, SOLUTION SUBCUTANEOUS
Qty: 4.5 ML | Refills: 1 | Status: SHIPPED | OUTPATIENT
Start: 2025-01-16

## 2025-01-16 RX ORDER — ACYCLOVIR 800 MG/1
TABLET ORAL
Qty: 2 EACH | Refills: 5 | Status: SHIPPED | OUTPATIENT
Start: 2025-01-16

## 2025-01-20 ENCOUNTER — TELEPHONE (OUTPATIENT)
Dept: ENDOCRINOLOGY | Facility: CLINIC | Age: 60
End: 2025-01-20

## 2025-01-21 NOTE — TELEPHONE ENCOUNTER
PA for Janumet -1000 MGSUBMITTED to [employer]    via      [x]Sures[employer]s-Case ID      [x]PA sent as URGENT    All office notes, labs and other pertaining documents and studies sent. Clinical questions answered. Awaiting determination from insurance company.     Turnaround time for your insurance to make a decision on your Prior Authorization can take 7-21 business days.

## 2025-01-22 ENCOUNTER — TELEPHONE (OUTPATIENT)
Dept: ENDOCRINOLOGY | Facility: CLINIC | Age: 60
End: 2025-01-22

## 2025-01-22 NOTE — TELEPHONE ENCOUNTER
PA for Janumet -1000 MG  DENIED    Reason:(Screenshot if applicable)        Message sent to office clinical pool Yes    Denial letter scanned into Media Yes    Appeal started No (Provider will need to decide if appeal is warranted and send clinical documentation to Prior Authorization Team for initiation.)    **Please follow up with your patient regarding denial and next steps**

## 2025-01-22 NOTE — TELEPHONE ENCOUNTER
PA for Janumet -1000 MG   APPEALED via       [x]Letter sent to insurance via fax [phone]      All necessary records sent. Will await response from insurance company    Turnaround time for a decision to be made on an appeal could take up to 30 business days

## 2025-02-12 DIAGNOSIS — E11.65 TYPE 2 DIABETES MELLITUS WITH HYPERGLYCEMIA, WITHOUT LONG-TERM CURRENT USE OF INSULIN (HCC): ICD-10-CM

## 2025-02-12 RX ORDER — REPAGLINIDE 2 MG/1
TABLET ORAL
Qty: 90 TABLET | Refills: 5 | Status: SHIPPED | OUTPATIENT
Start: 2025-02-12

## 2025-02-13 ENCOUNTER — TELEPHONE (OUTPATIENT)
Age: 60
End: 2025-02-13

## 2025-02-13 NOTE — TELEPHONE ENCOUNTER
Formulary Alternatives: saxagliptin-metformin ext-rel, ZITUVIMET, ZITUVIMET XR [NOTE: If yes, provide your patient with a new prescription for the formulary product.]

## 2025-02-13 NOTE — TELEPHONE ENCOUNTER
PA for sitaGLIPtin-metFORMIN (JANUMET)  MG per tablet SUBMITTED to [employer]     via      [x][facility] [employer]-Case ID # [id number]      [x]PA sent as URGENT    All office notes, labs and other pertaining documents and studies sent. Clinical questions answered. Awaiting determination from insurance company.     Turnaround time for your insurance to make a decision on your Prior Authorization can take 7-21 business days.

## 2025-02-14 ENCOUNTER — TELEPHONE (OUTPATIENT)
Dept: ENDOCRINOLOGY | Facility: CLINIC | Age: 60
End: 2025-02-14

## 2025-02-14 NOTE — TELEPHONE ENCOUNTER
[facility] final determination letter.   See media.  Thank you.

## 2025-02-17 ENCOUNTER — TELEPHONE (OUTPATIENT)
Dept: ENDOCRINOLOGY | Facility: CLINIC | Age: 60
End: 2025-02-17

## 2025-02-17 DIAGNOSIS — E11.65 TYPE 2 DIABETES MELLITUS WITH HYPERGLYCEMIA, WITHOUT LONG-TERM CURRENT USE OF INSULIN (HCC): ICD-10-CM

## 2025-02-18 RX ORDER — ACYCLOVIR 800 MG/1
TABLET ORAL
Qty: 2 EACH | Refills: 6 | Status: SHIPPED | OUTPATIENT
Start: 2025-02-18

## 2025-02-20 NOTE — TELEPHONE ENCOUNTER
Duplicate encounter created, please see telephone encounter from 2/13/25 regarding JANUMET PA status. Please review patient's chart to see if there is already an encounter regarding the medication in question and to document anything regarding this medication in regards to anything regarding the authorization process etc before creating another encounter Thank You.

## 2025-04-06 DIAGNOSIS — E11.65 TYPE 2 DIABETES MELLITUS WITH HYPERGLYCEMIA, WITHOUT LONG-TERM CURRENT USE OF INSULIN (HCC): ICD-10-CM

## 2025-04-07 RX ORDER — INSULIN GLARGINE 300 U/ML
INJECTION, SOLUTION SUBCUTANEOUS
Qty: 4.5 ML | Refills: 1 | Status: SHIPPED | OUTPATIENT
Start: 2025-04-07

## 2025-04-11 ENCOUNTER — TELEPHONE (OUTPATIENT)
Dept: ENDOCRINOLOGY | Facility: CLINIC | Age: 60
End: 2025-04-11

## 2025-04-14 NOTE — TELEPHONE ENCOUNTER
PA for  (FreeStyle Verito 3 Sensor)SUBMITTED to [employer]     Via    [x][employer]-Case ID # [id number]      [x]PA sent as URGENT    All office notes, labs and other pertaining documents and studies sent. Clinical questions answered. Awaiting determination from insurance company.     Turnaround time for your insurance to make a decision on your Prior Authorization can take 7-21 business days.

## 2025-04-15 NOTE — TELEPHONE ENCOUNTER
PA for (FreeStyle Verito 3 Sensor) APPROVED     Date(s) approved April 14, 2025 to April 14, 2026     Case #[id number]      Patient advised by          [][employer] Message  [x]Phone call   []LMOM  []L/M to call office as no active Communication consent on file  []Unable to leave detailed message as VM not approved on Communication consent       Pharmacy advised by    [x]Fax  []Phone call  []Secure Chat    Specialty Pharmacy    []     Approval letter scanned into Media Yes

## 2025-06-12 ENCOUNTER — TELEPHONE (OUTPATIENT)
Dept: ENDOCRINOLOGY | Facility: CLINIC | Age: 60
End: 2025-06-12

## 2025-06-12 DIAGNOSIS — E11.65 TYPE 2 DIABETES MELLITUS WITH HYPERGLYCEMIA, WITHOUT LONG-TERM CURRENT USE OF INSULIN (HCC): ICD-10-CM

## 2025-06-15 DIAGNOSIS — E11.65 TYPE 2 DIABETES MELLITUS WITH HYPERGLYCEMIA, WITHOUT LONG-TERM CURRENT USE OF INSULIN (HCC): ICD-10-CM

## 2025-06-16 RX ORDER — ACYCLOVIR 800 MG/1
TABLET ORAL
Qty: 2 EACH | Refills: 5 | Status: SHIPPED | OUTPATIENT
Start: 2025-06-16

## 2025-06-25 DIAGNOSIS — E11.65 TYPE 2 DIABETES MELLITUS WITH HYPERGLYCEMIA, WITHOUT LONG-TERM CURRENT USE OF INSULIN (HCC): ICD-10-CM

## 2025-06-25 RX ORDER — PEN NEEDLE, DIABETIC 32GX 5/32"
NEEDLE, DISPOSABLE MISCELLANEOUS
Qty: 100 EACH | Refills: 1 | Status: SHIPPED | OUTPATIENT
Start: 2025-06-25